# Patient Record
Sex: FEMALE | Race: OTHER | NOT HISPANIC OR LATINO
[De-identification: names, ages, dates, MRNs, and addresses within clinical notes are randomized per-mention and may not be internally consistent; named-entity substitution may affect disease eponyms.]

---

## 2019-06-13 ENCOUNTER — FORM ENCOUNTER (OUTPATIENT)
Age: 59
End: 2019-06-13

## 2020-03-02 ENCOUNTER — FORM ENCOUNTER (OUTPATIENT)
Age: 60
End: 2020-03-02

## 2020-03-05 ENCOUNTER — FORM ENCOUNTER (OUTPATIENT)
Age: 60
End: 2020-03-05

## 2020-03-12 ENCOUNTER — FORM ENCOUNTER (OUTPATIENT)
Age: 60
End: 2020-03-12

## 2020-03-18 ENCOUNTER — FORM ENCOUNTER (OUTPATIENT)
Age: 60
End: 2020-03-18

## 2020-04-09 ENCOUNTER — FORM ENCOUNTER (OUTPATIENT)
Age: 60
End: 2020-04-09

## 2020-07-23 ENCOUNTER — FORM ENCOUNTER (OUTPATIENT)
Age: 60
End: 2020-07-23

## 2020-09-01 ENCOUNTER — FORM ENCOUNTER (OUTPATIENT)
Age: 60
End: 2020-09-01

## 2020-09-03 ENCOUNTER — FORM ENCOUNTER (OUTPATIENT)
Age: 60
End: 2020-09-03

## 2020-09-18 ENCOUNTER — FORM ENCOUNTER (OUTPATIENT)
Age: 60
End: 2020-09-18

## 2020-10-11 ENCOUNTER — FORM ENCOUNTER (OUTPATIENT)
Age: 60
End: 2020-10-11

## 2020-10-18 ENCOUNTER — FORM ENCOUNTER (OUTPATIENT)
Age: 60
End: 2020-10-18

## 2020-11-01 ENCOUNTER — FORM ENCOUNTER (OUTPATIENT)
Age: 60
End: 2020-11-01

## 2020-11-08 ENCOUNTER — FORM ENCOUNTER (OUTPATIENT)
Age: 60
End: 2020-11-08

## 2021-02-11 PROBLEM — Z00.00 ENCOUNTER FOR PREVENTIVE HEALTH EXAMINATION: Status: ACTIVE | Noted: 2021-02-11

## 2021-02-12 DIAGNOSIS — Z17.1 MALIGNANT NEOPLASM OF UPPER-OUTER QUADRANT OF RIGHT FEMALE BREAST: ICD-10-CM

## 2021-02-12 DIAGNOSIS — H04.129 DRY EYE SYNDROME OF UNSPECIFIED LACRIMAL GLAND: ICD-10-CM

## 2021-02-12 DIAGNOSIS — Z80.3 FAMILY HISTORY OF MALIGNANT NEOPLASM OF BREAST: ICD-10-CM

## 2021-02-12 DIAGNOSIS — R92.2 INCONCLUSIVE MAMMOGRAM: ICD-10-CM

## 2021-02-12 DIAGNOSIS — C50.411 MALIGNANT NEOPLASM OF UPPER-OUTER QUADRANT OF RIGHT FEMALE BREAST: ICD-10-CM

## 2021-02-12 DIAGNOSIS — Z85.3 PERSONAL HISTORY OF MALIGNANT NEOPLASM OF BREAST: ICD-10-CM

## 2021-02-12 DIAGNOSIS — Z86.39 PERSONAL HISTORY OF OTHER ENDOCRINE, NUTRITIONAL AND METABOLIC DISEASE: ICD-10-CM

## 2021-02-16 ENCOUNTER — APPOINTMENT (OUTPATIENT)
Dept: BREAST CENTER | Facility: CLINIC | Age: 61
End: 2021-02-16
Payer: COMMERCIAL

## 2021-02-16 VITALS
HEIGHT: 66 IN | BODY MASS INDEX: 22.66 KG/M2 | SYSTOLIC BLOOD PRESSURE: 127 MMHG | DIASTOLIC BLOOD PRESSURE: 81 MMHG | WEIGHT: 141 LBS

## 2021-02-16 DIAGNOSIS — J30.2 OTHER SEASONAL ALLERGIC RHINITIS: ICD-10-CM

## 2021-02-16 PROCEDURE — 99072 ADDL SUPL MATRL&STAF TM PHE: CPT

## 2021-02-16 PROCEDURE — 99214 OFFICE O/P EST MOD 30 MIN: CPT

## 2021-02-16 RX ORDER — ADO-TRASTUZUMAB EMTANSINE 20 MG/ML
INJECTION, POWDER, LYOPHILIZED, FOR SOLUTION INTRAVENOUS
Refills: 0 | Status: ACTIVE | COMMUNITY

## 2021-02-16 RX ORDER — ESZOPICLONE 3 MG/1
TABLET, COATED ORAL
Refills: 0 | Status: ACTIVE | COMMUNITY

## 2021-02-16 NOTE — PHYSICAL EXAM
[Normocephalic] : normocephalic [Atraumatic] : atraumatic [EOMI] : extra ocular movement intact [Supple] : supple [No Supraclavicular Adenopathy] : no supraclavicular adenopathy [No Cervical Adenopathy] : no cervical adenopathy [Normal Sinus Rhythm] : normal sinus rhythm [Examined in the supine and seated position] : examined in the supine and seated position [No dominant masses] : no dominant masses in right breast  [No dominant masses] : no dominant masses left breast [No Nipple Retraction] : no left nipple retraction [No Nipple Discharge] : no left nipple discharge [Breast Mass Right Breast ___cm] : no masses [Breast Mass Left Breast ___cm] : no masses [Breast Nipple Inversion] : nipples not inverted [Breast Nipple Retraction] : nipples not retracted [Breast Nipple Flattening] : nipples not flattened [Breast Nipple Fissures] : nipples not fissured [No Axillary Lymphadenopathy] : no left axillary lymphadenopathy [No Edema] : no edema [No Rashes] : no rashes [No Ulceration] : no ulceration [de-identified] : On exam, the patient has the obvious bilateral reduction mastopexy's.  She did have radiation to the right breast and does have some increased skin thickening.  I cannot feel any evidence of recurrence in the right breast and her left breast is negative.  She has no axillary, supraclavicular, or cervical adenopathy. [de-identified] : Status post partial mastectomy with reduction mastopexy and external beam radiation [de-identified] : Status post reduction mastopexy for symmetry

## 2021-02-16 NOTE — REASON FOR VISIT
[Follow-Up: _____] : a [unfilled] follow-up visit [FreeTextEntry1] : The patient comes in with a history of a right breast upper outer quadrant high-grade invasive ductal cancer which was ER NM negative HER-2/hugh 3+ with a high Ki-67 diagnosed around March 2020 for which she underwent neoadjuvant TCHP chemotherapy with  and then underwent a right breast partial mastectomy and sentinel lymph node biopsy with bilateral reduction mastopexy by Dr. Griffiths on October 12, 2020.  She had 3 negative right axillary lymph nodes and a residual 8 mm focus of high-grade invasive duct cancer which was downgraded from a clinical stage IIA to a pathologic prognostic stage IA breast cancer after neoadjuvant chemotherapy.  She was switched to Kadcyla and underwent external beam radiation to the right breast and comes in for routine follow-up.

## 2021-02-16 NOTE — REVIEW OF SYSTEMS
[As Noted in HPI] : as noted in HPI [Constipation] : constipation [Negative] : Heme/Lymph [FreeTextEntry2] : fATIGUE

## 2021-02-16 NOTE — HISTORY OF PRESENT ILLNESS
[FreeTextEntry1] : The patient is a 60-year-old  postmenopausal female of -American descent.  She underwent menarche at age 15 and had her first child at age 32.  She underwent menopause at age 49 and never took any hormone replacement therapy.  She has a strong family history with her maternal aunt who had breast cancer at age 65 and she has a maternal cousin who had breast cancer at age 49 and a paternal cousin had breast cancer at age 50.  The patient felt a mass towards the upper outer aspect of the right breast and underwent a mammography and ultrasound on March 3, 2020 showing a 2.9 x 2.2 x 2.2 cm mass in the right breast 10:00 region 7 cm from the nipple.  There was also a suspicious right axillary lymph node seen in the central right breast 9:00 intramammary lymph node.  Ultrasound-guided core biopsy of the right breast 10:00 mass 7 cm from the nipple as well as the intramammary lymph node at the 9:00 region central and centimeters from the nipple was performed on 2020.  The right breast 10:00 density came back as a high-grade infiltrating ductal cancer which was ER/IN negative HER-2/hugh 3+ by IHC with a Ki-67 of 80%.  The intramammary lymph node at the 9 o'clock position came back as a benign reactive lymph node.  The patient underwent a bilateral breast MRI in 2020 showing the known cancer in the right breast 10:00 region 7 cm from the nipple measuring 2.8 x 3.3 x 3.5 cm.  There was another area of concern in the right breast 9:00 region showing a possible ultrasound correlate 1 cm from the nipple and in the 10:00 region 12 cm from the nipple.  Ultrasound-guided core biopsy was performed in the 9:00 region 1 cm from the nipple which is showed fibrocystic change and stromal fibrosis.  She underwent [facility] genetic panel testing in 2020 which was negative.  She underwent TCHP chemotherapy with Dr. Krueger and then underwent a follow-up MRI on 2020 showing a complete radiologic response to the right breast cancer.  She then underwent a right breast partial mastectomy with sentinel lymph node biopsy and bilateral reduction mastopexy's by Dr. Lees on 2020.  Final pathology showed 3 negative right axillary sentinel lymph nodes and the partial mastectomy showed an 8 mm residual focus of invasive high-grade ductal breast cancer which remained ER/IN negative HER-2/hugh 3+ positive.  She was downgraded from a clinical stage IIA to a pathologic prognostic stage IA breast cancer after neoadjuvant chemotherapy.  She underwent external beam radiation through Dr. Cervantes which finished in 2020 and was switched to [facility] by Dr. Krueger and comes in now for routine follow-up.

## 2021-02-16 NOTE — ASSESSMENT
[FreeTextEntry1] : The patient is a 60-year-old  postmenopausal female of -American descent.  She underwent menarche at age 15 and had her first child at age 32.  She underwent menopause at age 49 and never took any hormone replacement therapy.  She has a strong family history with her maternal aunt who had breast cancer at age 65 and she has a maternal cousin who had breast cancer at age 49 and a paternal cousin had breast cancer at age 50.  The patient felt a mass towards the upper outer aspect of the right breast and underwent a mammography and ultrasound on March 3, 2020 showing a 2.9 x 2.2 x 2.2 cm mass in the right breast 10:00 region 7 cm from the nipple.  There was also a suspicious right axillary lymph node seen in the central right breast 9:00 intramammary lymph node.  Ultrasound-guided core biopsy of the right breast 10:00 mass 7 cm from the nipple as well as the intramammary lymph node at the 9:00 region central and centimeters from the nipple was performed on 2020.  The right breast 10:00 density came back as a high-grade infiltrating ductal cancer which was ER/LA negative HER-2/hugh 3+ by IHC with a Ki-67 of 80%.  The intramammary lymph node at the 9 o'clock position came back as a benign reactive lymph node.  The patient underwent a bilateral breast MRI in 2020 showing the known cancer in the right breast 10:00 region 7 cm from the nipple measuring 2.8 x 3.3 x 3.5 cm.  There was another area of concern in the right breast 9:00 region showing a possible ultrasound correlate 1 cm from the nipple and in the 10:00 region 12 cm from the nipple.  Ultrasound-guided core biopsy was performed in the 9:00 region 1 cm from the nipple which is showed fibrocystic change and stromal fibrosis.  She underwent [facility] genetic panel testing in 2020 which was negative.  She underwent TCHP chemotherapy with Dr. Krueger and then underwent a follow-up MRI on 2020 showing a complete radiologic response to the right breast cancer.  She then underwent a right breast partial mastectomy with sentinel lymph node biopsy and bilateral reduction mastopexy's by Dr. Lees on 2020.  Final pathology showed 3 negative right axillary sentinel lymph nodes and the partial mastectomy showed an 8 mm residual focus of invasive high-grade ductal breast cancer which remained ER/LA negative HER-2/hugh 3+ positive.  She was downgraded from a clinical stage IIA to a pathologic prognostic stage IA breast cancer after neoadjuvant chemotherapy.  She underwent external beam radiation through Dr. Cervantes which finished in 2020 and was switched to Kadcyla by Dr. Krueger.  On exam, she has typical postop and post radiation changes to the right breast but no evidence of recurrence.  The patient is due for her bilateral mammography and ultrasound again in 2021.  She can follow-up again in 6 months and should remain on Kadcyla through Dr. Krueger.

## 2021-08-19 ENCOUNTER — NON-APPOINTMENT (OUTPATIENT)
Age: 61
End: 2021-08-19

## 2021-08-19 ENCOUNTER — APPOINTMENT (OUTPATIENT)
Dept: BREAST CENTER | Facility: CLINIC | Age: 61
End: 2021-08-19
Payer: COMMERCIAL

## 2021-08-19 VITALS
BODY MASS INDEX: 22.66 KG/M2 | SYSTOLIC BLOOD PRESSURE: 106 MMHG | HEART RATE: 76 BPM | HEIGHT: 66 IN | WEIGHT: 141 LBS | DIASTOLIC BLOOD PRESSURE: 72 MMHG | OXYGEN SATURATION: 99 %

## 2021-08-19 PROCEDURE — 99214 OFFICE O/P EST MOD 30 MIN: CPT

## 2021-08-19 NOTE — HISTORY OF PRESENT ILLNESS
[FreeTextEntry1] : The patient is a 61-year-old  postmenopausal female of -American descent.  She underwent menarche at age 15 and had her first child at age 32.  She underwent menopause at age 49 and never took any hormone replacement therapy.  She has a strong family history with her maternal aunt who had breast cancer at age 65 and she has a maternal cousin who had breast cancer at age 49 and a paternal cousin had breast cancer at age 50.  The patient felt a mass towards the upper outer aspect of the right breast and underwent a mammography and ultrasound on March 3, 2020 showing a 2.9 x 2.2 x 2.2 cm mass in the right breast 10:00 region 7 cm from the nipple.  There was also a suspicious right axillary lymph node seen in the central right breast 9:00 intramammary lymph node.  Ultrasound-guided core biopsy of the right breast 10:00 mass 7 cm from the nipple as well as the intramammary lymph node at the 9:00 region central and centimeters from the nipple was performed on 2020.  The right breast 10:00 density came back as a high-grade infiltrating ductal cancer which was ER/MT negative HER-2/hugh 3+ by IHC with a Ki-67 of 80%.  The intramammary lymph node at the 9 o'clock position came back as a benign reactive lymph node.  The patient underwent a bilateral breast MRI in 2020 showing the known cancer in the right breast 10:00 region 7 cm from the nipple measuring 2.8 x 3.3 x 3.5 cm.  There was another area of concern in the right breast 9:00 region showing a possible ultrasound correlate 1 cm from the nipple and in the 10:00 region 12 cm from the nipple.  Ultrasound-guided core biopsy was performed in the 9:00 region 1 cm from the nipple which is showed fibrocystic change and stromal fibrosis.  She underwent [facility] genetic panel testing in 2020 which was negative.  She underwent TCHP chemotherapy with Dr. Krueger and then underwent a follow-up MRI on 2020 showing a complete radiologic response to the right breast cancer.  She then underwent a right breast partial mastectomy with sentinel lymph node biopsy and bilateral reduction mastopexy's by Dr. Lees on 2020.  Final pathology showed 3 negative right axillary sentinel lymph nodes and the partial mastectomy showed an 8 mm residual focus of invasive high-grade ductal breast cancer which remained ER/MT negative HER-2/hugh 3+ positive.  She was downgraded from a clinical stage IIA to a pathologic prognostic stage IA breast cancer after neoadjuvant chemotherapy.  She underwent external beam radiation through Dr. Cervantes which finished in 2020 and was switched to Kadcyla by Dr. Krueger which she finished in 2021 and was started on Femara for some reason. She comes in now for routine follow-up.

## 2021-08-19 NOTE — PHYSICAL EXAM
[Normocephalic] : normocephalic [Atraumatic] : atraumatic [EOMI] : extra ocular movement intact [Supple] : supple [No Supraclavicular Adenopathy] : no supraclavicular adenopathy [No Cervical Adenopathy] : no cervical adenopathy [Normal Sinus Rhythm] : normal sinus rhythm [Examined in the supine and seated position] : examined in the supine and seated position [No dominant masses] : no dominant masses in right breast  [No dominant masses] : no dominant masses left breast [No Nipple Retraction] : no left nipple retraction [No Nipple Discharge] : no left nipple discharge [Breast Mass Right Breast ___cm] : no masses [Breast Mass Left Breast ___cm] : no masses [No Axillary Lymphadenopathy] : no left axillary lymphadenopathy [No Edema] : no edema [No Rashes] : no rashes [No Ulceration] : no ulceration [Breast Nipple Inversion] : nipples not inverted [Breast Nipple Retraction] : nipples not retracted [Breast Nipple Fissures] : nipples not fissured [Breast Nipple Flattening] : nipples not flattened [Breast Abnormal Lactation (Galactorrhea)] : no galactorrhea [Breast Abnormal Secretion Bloody Fluid] : no bloody discharge [Breast Abnormal Secretion Serous Fluid] : no serous discharge [Breast Abnormal Secretion Opalescent Fluid] : no milky discharge [de-identified] : On exam, the patient has the obvious bilateral reduction mastopexy's.  She did have radiation to the right breast and does have some increased skin thickening.  I cannot feel any evidence of recurrence in the right breast and her left breast is negative.  She has no axillary, supraclavicular, or cervical adenopathy.  She does have some frozen shoulders bilaterally with some decreased range of motion of her arms. [de-identified] : Status post partial mastectomy with reduction mastopexy and external beam radiation [de-identified] : Status post reduction mastopexy for symmetry

## 2021-08-19 NOTE — REASON FOR VISIT
[Follow-Up: _____] : a [unfilled] follow-up visit [FreeTextEntry1] : The patient comes in with a history of a right breast upper outer quadrant high-grade invasive ductal cancer which was ER MS negative HER-2/hugh 3+ with a high Ki-67 diagnosed around March 2020 for which she underwent neoadjuvant TCHP chemotherapy with  and then underwent a right breast partial mastectomy and sentinel lymph node biopsy with bilateral reduction mastopexy by Dr. Griffiths on October 12, 2020.  She had 3 negative right axillary lymph nodes and a residual 8 mm focus of high-grade invasive duct cancer which was downgraded from a clinical stage IIA to a pathologic prognostic stage IA breast cancer after neoadjuvant chemotherapy.  She was switched to Kadcyla and underwent external beam radiation to the right breast and comes in for routine follow-up.

## 2021-08-19 NOTE — ASSESSMENT
[FreeTextEntry1] : The patient is a 61-year-old  postmenopausal female of -American descent.  She underwent menarche at age 15 and had her first child at age 32.  She underwent menopause at age 49 and never took any hormone replacement therapy.  She has a strong family history with her maternal aunt who had breast cancer at age 65 and she has a maternal cousin who had breast cancer at age 49 and a paternal cousin had breast cancer at age 50.  The patient felt a mass towards the upper outer aspect of the right breast and underwent a mammography and ultrasound on March 3, 2020 showing a 2.9 x 2.2 x 2.2 cm mass in the right breast 10:00 region 7 cm from the nipple.  There was also a suspicious right axillary lymph node seen in the central right breast 9:00 intramammary lymph node.  Ultrasound-guided core biopsy of the right breast 10:00 mass 7 cm from the nipple as well as the intramammary lymph node at the 9:00 region central and centimeters from the nipple was performed on 2020.  The right breast 10:00 density came back as a high-grade infiltrating ductal cancer which was ER/MI negative HER-2/hugh 3+ by IHC with a Ki-67 of 80%.  The intramammary lymph node at the 9 o'clock position came back as a benign reactive lymph node.  The patient underwent a bilateral breast MRI in 2020 showing the known cancer in the right breast 10:00 region 7 cm from the nipple measuring 2.8 x 3.3 x 3.5 cm.  There was another area of concern in the right breast 9:00 region showing a possible ultrasound correlate 1 cm from the nipple and in the 10:00 region 12 cm from the nipple.  Ultrasound-guided core biopsy was performed in the 9:00 region 1 cm from the nipple which is showed fibrocystic change and stromal fibrosis.  She underwent [facility] genetic panel testing in 2020 which was negative.  She underwent TCHP chemotherapy with Dr. Krueger and then underwent a follow-up MRI on 2020 showing a complete radiologic response to the right breast cancer.  She then underwent a right breast partial mastectomy with sentinel lymph node biopsy and bilateral reduction mastopexy's by Dr. Lees on 2020.  Final pathology showed 3 negative right axillary sentinel lymph nodes and the partial mastectomy showed an 8 mm residual focus of invasive high-grade ductal breast cancer which remained ER/MI negative HER-2/hugh 3+ positive.  She was downgraded from a clinical stage IIA to a pathologic prognostic stage IA breast cancer after neoadjuvant chemotherapy.  She underwent external beam radiation through Dr. Cervantes which finished in 2020 and was switched to Kadcyla by Dr. Krueger finished in 2021 and then was placed on Femara for some reason even though the tumor was ER/MI negative.  On exam, she has typical postop and post radiation changes to the right breast but no evidence of recurrence.  The patient underwent her last bilateral mammography and ultrasound at [facility] on May 6, 2021 just showing postop changes in the upper outer aspect of the right breast.  She can follow-up again in 6 months and should remain on Femara through Dr. Krueger.

## 2021-09-02 ENCOUNTER — TRANSCRIPTION ENCOUNTER (OUTPATIENT)
Age: 61
End: 2021-09-02

## 2022-02-08 ENCOUNTER — APPOINTMENT (OUTPATIENT)
Dept: BREAST CENTER | Facility: CLINIC | Age: 62
End: 2022-02-08
Payer: COMMERCIAL

## 2022-02-08 VITALS — BODY MASS INDEX: 24.07 KG/M2 | WEIGHT: 141 LBS | HEIGHT: 64 IN

## 2022-02-08 PROCEDURE — 99213 OFFICE O/P EST LOW 20 MIN: CPT

## 2022-02-08 NOTE — ASSESSMENT
[FreeTextEntry1] : The patient is a 61-year-old  postmenopausal female of -American descent.  She underwent menarche at age 15 and had her first child at age 32.  She underwent menopause at age 49 and never took any hormone replacement therapy.  She has a strong family history with her maternal aunt who had breast cancer at age 65 and she has a maternal cousin who had breast cancer at age 49 and a paternal cousin had breast cancer at age 50.  The patient felt a mass towards the upper outer aspect of the right breast and underwent a mammography and ultrasound on March 3, 2020 showing a 2.9 x 2.2 x 2.2 cm mass in the right breast 10:00 region 7 cm from the nipple.  There was also a suspicious right axillary lymph node seen in the central right breast 9:00 intramammary lymph node.  Ultrasound-guided core biopsy of the right breast 10:00 mass 7 cm from the nipple as well as the intramammary lymph node at the 9:00 region central and centimeters from the nipple was performed on 2020.  The right breast 10:00 density came back as a high-grade infiltrating ductal cancer which was ER/ND negative HER-2/hugh 3+ by IHC with a Ki-67 of 80%.  The intramammary lymph node at the 9 o'clock position came back as a benign reactive lymph node.  The patient underwent a bilateral breast MRI in 2020 showing the known cancer in the right breast 10:00 region 7 cm from the nipple measuring 2.8 x 3.3 x 3.5 cm.  There was another area of concern in the right breast 9:00 region showing a possible ultrasound correlate 1 cm from the nipple and in the 10:00 region 12 cm from the nipple.  Ultrasound-guided core biopsy was performed in the 9:00 region 1 cm from the nipple which is showed fibrocystic change and stromal fibrosis.  She underwent [facility] genetic panel testing in 2020 which was negative.  She underwent TCHP chemotherapy with Dr. Krueger and then underwent a follow-up MRI on 2020 showing a complete radiologic response to the right breast cancer.  She then underwent a right breast partial mastectomy with sentinel lymph node biopsy and bilateral reduction mastopexy's by Dr. Lees on 2020.  Final pathology showed 3 negative right axillary sentinel lymph nodes and the partial mastectomy showed an 8 mm residual focus of invasive high-grade ductal breast cancer which remained ER/ND negative HER-2/hugh 3+ positive.  She was downgraded from a clinical stage IIA to a pathologic prognostic stage IA breast cancer after neoadjuvant chemotherapy.  She underwent external beam radiation through Dr. Cervantes which finished in 2020 and was switched to Kadcyla by Dr. Krueger which finished in 2021 and then was placed on Femara for some reason even though the tumor was ER/ND negative.  On exam, she has typical postop and post radiation changes to the right breast but no evidence of recurrence.  The patient underwent her last bilateral mammography and ultrasound which was reviewed from [facility] performed on May 6, 2021 just showing postop changes in the upper outer aspect of the right breast.  She should follow-up again in 6 months and should remain on Femara through Dr. Krueger.  She will be due for her next bilateral mammography and ultrasound in May 2022 and was given prescriptions.

## 2022-02-08 NOTE — PHYSICAL EXAM
[Normocephalic] : normocephalic [Atraumatic] : atraumatic [EOMI] : extra ocular movement intact [Supple] : supple [No Supraclavicular Adenopathy] : no supraclavicular adenopathy [No Cervical Adenopathy] : no cervical adenopathy [Normal Sinus Rhythm] : normal sinus rhythm [Examined in the supine and seated position] : examined in the supine and seated position [No dominant masses] : no dominant masses in right breast  [No dominant masses] : no dominant masses left breast [No Nipple Retraction] : no left nipple retraction [No Nipple Discharge] : no left nipple discharge [Breast Mass Right Breast ___cm] : no masses [Breast Mass Left Breast ___cm] : no masses [No Axillary Lymphadenopathy] : no left axillary lymphadenopathy [No Edema] : no edema [No Rashes] : no rashes [No Ulceration] : no ulceration [Breast Nipple Inversion] : nipples not inverted [Breast Nipple Retraction] : nipples not retracted [Breast Nipple Flattening] : nipples not flattened [Breast Nipple Fissures] : nipples not fissured [Breast Abnormal Lactation (Galactorrhea)] : no galactorrhea [Breast Abnormal Secretion Bloody Fluid] : no bloody discharge [Breast Abnormal Secretion Serous Fluid] : no serous discharge [Breast Abnormal Secretion Opalescent Fluid] : no milky discharge [de-identified] : On exam, the patient has the obvious bilateral reduction mastopexy's.  She did have radiation to the right breast and does have some increased skin thickening.  I cannot feel any evidence of recurrence in the right breast and her left breast is negative.  She has no axillary, supraclavicular, or cervical adenopathy. [de-identified] : Status post partial mastectomy with reduction mastopexy and external beam radiation with some skin thickening and mild lymphedema [de-identified] : Status post reduction mastopexy for symmetry

## 2022-02-08 NOTE — REASON FOR VISIT
[Follow-Up: _____] : a [unfilled] follow-up visit [FreeTextEntry1] : The patient comes in with a history of a right breast upper outer quadrant high-grade invasive ductal cancer which was ER FL negative HER-2/hugh 3+ with a high Ki-67 diagnosed around March 2020 for which she underwent neoadjuvant TCHP chemotherapy with  and then underwent a right breast partial mastectomy and sentinel lymph node biopsy with bilateral reduction mastopexy by Dr. Griffiths on October 12, 2020.  She had 3 negative right axillary lymph nodes and a residual 8 mm focus of high-grade invasive duct cancer which was downgraded from a clinical stage IIA to a pathologic prognostic stage IA breast cancer after neoadjuvant chemotherapy.  She was switched to Kadcyla and underwent external beam radiation to the right breast and comes in for routine follow-up.

## 2022-02-08 NOTE — HISTORY OF PRESENT ILLNESS
[FreeTextEntry1] : The patient is a 61-year-old  postmenopausal female of -American descent.  She underwent menarche at age 15 and had her first child at age 32.  She underwent menopause at age 49 and never took any hormone replacement therapy.  She has a strong family history with her maternal aunt who had breast cancer at age 65 and she has a maternal cousin who had breast cancer at age 49 and a paternal cousin had breast cancer at age 50.  The patient felt a mass towards the upper outer aspect of the right breast and underwent a mammography and ultrasound on March 3, 2020 showing a 2.9 x 2.2 x 2.2 cm mass in the right breast 10:00 region 7 cm from the nipple.  There was also a suspicious right axillary lymph node seen in the central right breast 9:00 intramammary lymph node.  Ultrasound-guided core biopsy of the right breast 10:00 mass 7 cm from the nipple as well as the intramammary lymph node at the 9:00 region central and centimeters from the nipple was performed on 2020.  The right breast 10:00 density came back as a high-grade infiltrating ductal cancer which was ER/NC negative HER-2/hugh 3+ by IHC with a Ki-67 of 80%.  The intramammary lymph node at the 9 o'clock position came back as a benign reactive lymph node.  The patient underwent a bilateral breast MRI in 2020 showing the known cancer in the right breast 10:00 region 7 cm from the nipple measuring 2.8 x 3.3 x 3.5 cm.  There was another area of concern in the right breast 9:00 region showing a possible ultrasound correlate 1 cm from the nipple and in the 10:00 region 12 cm from the nipple.  Ultrasound-guided core biopsy was performed in the 9:00 region 1 cm from the nipple which is showed fibrocystic change and stromal fibrosis.  She underwent [facility] genetic panel testing in 2020 which was negative.  She underwent TCHP chemotherapy with Dr. Krueger and then underwent a follow-up MRI on 2020 showing a complete radiologic response to the right breast cancer.  She then underwent a right breast partial mastectomy with sentinel lymph node biopsy and bilateral reduction mastopexy's by Dr. Lees on 2020.  Final pathology showed 3 negative right axillary sentinel lymph nodes and the partial mastectomy showed an 8 mm residual focus of invasive high-grade ductal breast cancer which remained ER/NC negative HER-2/hugh 3+ positive.  She was downgraded from a clinical stage IIA to a pathologic prognostic stage IA breast cancer after neoadjuvant chemotherapy.  She underwent external beam radiation through Dr. Cervantes which finished in 2020 and was switched to Kadcyla by Dr. Krueger which she finished in 2021 and was started on Femara for some reason. She comes in now for routine follow-up.

## 2022-08-09 ENCOUNTER — APPOINTMENT (OUTPATIENT)
Dept: BREAST CENTER | Facility: CLINIC | Age: 62
End: 2022-08-09

## 2022-08-09 VITALS
SYSTOLIC BLOOD PRESSURE: 129 MMHG | HEART RATE: 74 BPM | BODY MASS INDEX: 26.61 KG/M2 | DIASTOLIC BLOOD PRESSURE: 83 MMHG | WEIGHT: 155 LBS | OXYGEN SATURATION: 98 %

## 2022-08-09 PROCEDURE — 99213 OFFICE O/P EST LOW 20 MIN: CPT

## 2022-08-09 NOTE — REASON FOR VISIT
[Follow-Up: _____] : a [unfilled] follow-up visit [FreeTextEntry1] : The patient comes in with a history of a right breast upper outer quadrant high-grade invasive ductal cancer which was ER IA negative HER-2/hugh 3+ with a high Ki-67 diagnosed around March 2020 for which she underwent neoadjuvant TCHP chemotherapy with  and then underwent a right breast partial mastectomy and sentinel lymph node biopsy with bilateral reduction mastopexy by Dr. Griffiths on October 12, 2020.  She had 3 negative right axillary lymph nodes and a residual 8 mm focus of high-grade invasive duct cancer which was downgraded from a clinical stage IIA to a pathologic prognostic stage IA breast cancer after neoadjuvant chemotherapy.  She was switched to Kadcyla and underwent external beam radiation to the right breast and comes in for routine follow-up.

## 2022-08-09 NOTE — HISTORY OF PRESENT ILLNESS
[FreeTextEntry1] : The patient is a 62-year-old  postmenopausal female of -American descent.  She underwent menarche at age 15 and had her first child at age 32.  She underwent menopause at age 49 and never took any hormone replacement therapy.  She has a strong family history with her maternal aunt who had breast cancer at age 65 and she has a maternal cousin who had breast cancer at age 49 and a paternal cousin had breast cancer at age 50.  The patient felt a mass towards the upper outer aspect of the right breast and underwent a mammography and ultrasound on March 3, 2020 showing a 2.9 x 2.2 x 2.2 cm mass in the right breast 10:00 region 7 cm from the nipple.  There was also a suspicious right axillary lymph node seen and a central right breast 9:00 intramammary lymph node.  Ultrasound-guided core biopsy of the right breast 10:00 mass 7 cm from the nipple as well as the intramammary lymph node at the 9:00 region 7 centimeters from the nipple was performed on 2020.  The right breast 10:00 density came back as a high-grade infiltrating ductal cancer which was ER/OH negative HER-2/hugh 3+ by IHC with a Ki-67 of 80%.  The intramammary lymph node at the 9 o'clock position came back as a benign reactive lymph node.  The patient underwent a bilateral breast MRI in 2020 showing the known cancer in the right breast 10:00 region 7 cm from the nipple measuring 2.8 x 3.3 x 3.5 cm.  There was another area of concern in the right breast 9:00 region showing a possible ultrasound correlate 1 cm from the nipple and in the 10:00 region 12 cm from the nipple.  Ultrasound-guided core biopsy was performed in the 9:00 region 1 cm from the nipple which is showed fibrocystic change and stromal fibrosis.  She underwent [facility] genetic panel testing in 2020 which was negative.  She underwent TCHP chemotherapy with Dr. Krueger and then underwent a follow-up MRI on 2020 showing a complete radiologic response to the right breast cancer.  She then underwent a right breast partial mastectomy with sentinel lymph node biopsy and bilateral reduction mastopexy's by Dr. Lees on 2020.  Final pathology showed 3 negative right axillary sentinel lymph nodes and the partial mastectomy showed an 8 mm residual focus of invasive high-grade ductal breast cancer which remained ER/OH negative HER-2/hugh 3+ positive.  She was downgraded from a clinical stage IIA to a pathologic prognostic stage IA breast cancer after neoadjuvant chemotherapy.  She underwent external beam radiation through Dr. Cervantes which finished in 2020 and was switched to Kadcyla by Dr. Krueger which she finished in 2021 and was started on Femara for some reason. She comes in now for routine follow-up.

## 2022-08-09 NOTE — ASSESSMENT
[FreeTextEntry1] : The patient is a 62-year-old  postmenopausal female of -American descent.  She underwent menarche at age 15 and had her first child at age 32.  She underwent menopause at age 49 and never took any hormone replacement therapy.  She has a strong family history with her maternal aunt who had breast cancer at age 65 and she has a maternal cousin who had breast cancer at age 49 and a paternal cousin had breast cancer at age 50.  The patient felt a mass towards the upper outer aspect of the right breast and underwent a mammography and ultrasound on March 3, 2020 showing a 2.9 x 2.2 x 2.2 cm mass in the right breast 10:00 region 7 cm from the nipple.  There was also a suspicious right axillary lymph node seen and a central right breast 9:00 intramammary lymph node.  Ultrasound-guided core biopsy of the right breast 10:00 mass 7 cm from the nipple as well as the intramammary lymph node at the 9:00 region 7 centimeters from the nipple was performed on 2020.  The right breast 10:00 density came back as a high-grade infiltrating ductal cancer which was ER/ID negative HER-2/hugh 3+ by IHC with a Ki-67 of 80%.  The intramammary lymph node at the 9 o'clock position came back as a benign reactive lymph node.  The patient underwent a bilateral breast MRI in 2020 showing the known cancer in the right breast 10:00 region 7 cm from the nipple measuring 2.8 x 3.3 x 3.5 cm.  There was another area of concern in the right breast 9:00 region showing a possible ultrasound correlate 1 cm from the nipple and in the 10:00 region 12 cm from the nipple.  Ultrasound-guided core biopsy was performed in the 9:00 region 1 cm from the nipple which showed fibrocystic change and stromal fibrosis.  She underwent [facility] genetic panel testing in 2020 which was negative.  She underwent TCHP chemotherapy with Dr. Krueger and then underwent a follow-up MRI on 2020 showing a complete radiologic response to the right breast cancer.  She then underwent a right breast partial mastectomy with sentinel lymph node biopsy and bilateral reduction mastopexy's by Dr. Lees on 2020.  Final pathology showed 3 negative right axillary sentinel lymph nodes and the partial mastectomy showed an 8 mm residual focus of invasive high-grade ductal breast cancer which remained ER/ID negative HER-2/hugh 3+ positive.  She was downgraded from a clinical stage IIA to a pathologic prognostic stage IA breast cancer after neoadjuvant chemotherapy.  She underwent external beam radiation through Dr. Cervantes which finished in 2020 and was switched to Kadcyla by Dr. Krueger which finished in 2021 and then was placed on Femara for some reason even though the tumor was ER/ID negative.  On exam, she has typical postop and post radiation changes to the right breast but no evidence of recurrence.  The patient underwent her last bilateral mammography and ultrasound which was reviewed from [facility] performed on May 20, 2022 just showing postop changes in the upper outer aspect of the right breast.  She should follow-up again in 6 months and should remain on Femara through Dr. Krueger.  She will be due for her next bilateral mammography and ultrasound in May 2023 and was given prescriptions.

## 2023-02-08 ENCOUNTER — APPOINTMENT (OUTPATIENT)
Dept: BREAST CENTER | Facility: CLINIC | Age: 63
End: 2023-02-08
Payer: COMMERCIAL

## 2023-02-08 ENCOUNTER — NON-APPOINTMENT (OUTPATIENT)
Age: 63
End: 2023-02-08

## 2023-02-08 VITALS
BODY MASS INDEX: 26.43 KG/M2 | WEIGHT: 154 LBS | HEART RATE: 80 BPM | DIASTOLIC BLOOD PRESSURE: 72 MMHG | OXYGEN SATURATION: 98 % | SYSTOLIC BLOOD PRESSURE: 116 MMHG

## 2023-02-08 PROCEDURE — 99213 OFFICE O/P EST LOW 20 MIN: CPT

## 2023-02-08 NOTE — REASON FOR VISIT
[Follow-Up: _____] : a [unfilled] follow-up visit [FreeTextEntry1] : The patient comes in with a history of a right breast upper outer quadrant high-grade invasive ductal cancer which was ER MT negative HER-2/hugh 3+ with a high Ki-67 diagnosed around March 2020 for which she underwent neoadjuvant TCHP chemotherapy with  and then underwent a right breast partial mastectomy and sentinel lymph node biopsy with bilateral reduction mastopexy by Dr. Griffiths on October 12, 2020.  She had 3 negative right axillary lymph nodes and a residual 8 mm focus of high-grade invasive duct cancer which was downgraded from a clinical stage IIA to a pathologic prognostic stage IA breast cancer after neoadjuvant chemotherapy.  She was switched to Kadcyla and underwent external beam radiation to the right breast and comes in for routine follow-up.

## 2023-02-08 NOTE — ASSESSMENT
[FreeTextEntry1] : The patient is a 62-year-old  postmenopausal female of -American descent.  She underwent menarche at age 15 and had her first child at age 32.  She underwent menopause at age 49 and never took any hormone replacement therapy.  She has a strong family history with her maternal aunt who had breast cancer at age 65 and she has a maternal cousin who had breast cancer at age 49 and a paternal cousin had breast cancer at age 50.  The patient felt a mass towards the upper outer aspect of the right breast and underwent a mammography and ultrasound on March 3, 2020 showing a 2.9 x 2.2 x 2.2 cm mass in the right breast 10:00 region 7 cm from the nipple.  There was also a suspicious right axillary lymph node seen and a central right breast 9:00 intramammary lymph node.  Ultrasound-guided core biopsy of the right breast 10:00 mass 7 cm from the nipple as well as the intramammary lymph node at the 9:00 region 7 centimeters from the nipple was performed on 2020.  The right breast 10:00 density came back as a high-grade infiltrating ductal cancer which was ER/MI negative HER-2/hugh 3+ by IHC with a Ki-67 of 80%.  The intramammary lymph node at the 9 o'clock position came back as a benign reactive lymph node.  The patient underwent a bilateral breast MRI in 2020 showing the known cancer in the right breast 10:00 region 7 cm from the nipple measuring 2.8 x 3.3 x 3.5 cm.  There was another area of concern in the right breast 9:00 region showing a possible ultrasound correlate 1 cm from the nipple and in the 10:00 region 12 cm from the nipple.  Ultrasound-guided core biopsy was performed in the 9:00 region 1 cm from the nipple which showed fibrocystic change and stromal fibrosis.  She underwent [facility] genetic panel testing in 2020 which was negative.  She underwent TCHP chemotherapy with Dr. Krueger and then underwent a follow-up MRI on 2020 showing a complete radiologic response to the right breast cancer.  She then underwent a right breast partial mastectomy with sentinel lymph node biopsy and bilateral reduction mastopexy's by Dr. Lees on 2020.  Final pathology showed 3 negative right axillary sentinel lymph nodes and the partial mastectomy showed an 8 mm residual focus of invasive high-grade ductal breast cancer which remained ER/MI negative HER-2/hugh 3+ positive.  She was downgraded from a clinical stage IIA to a pathologic prognostic stage IA breast cancer after neoadjuvant chemotherapy.  She underwent external beam radiation through Dr. Cervantes which finished in 2020 and was switched to Kadcyla by Dr. Krueger which finished in 2021 and then was placed on Femara for some reason even though the tumor was ER/MI negative.  On exam, she has typical postop and post radiation changes to the right breast but no evidence of recurrence.  The patient underwent her last bilateral mammography and ultrasound which was reviewed from [facility] performed on May 20, 2022 just showing postop changes in the upper outer aspect of the right breast.  She should follow-up again in 6 months and should remain on Femara through Dr. Diaz.  She will be due for her next bilateral mammography and ultrasound in May 2023 and she was given prescriptions.

## 2023-02-08 NOTE — HISTORY OF PRESENT ILLNESS
[FreeTextEntry1] : The patient is a 62-year-old  postmenopausal female of -American descent.  She underwent menarche at age 15 and had her first child at age 32.  She underwent menopause at age 49 and never took any hormone replacement therapy.  She has a strong family history with her maternal aunt who had breast cancer at age 65 and she has a maternal cousin who had breast cancer at age 49 and a paternal cousin had breast cancer at age 50.  The patient felt a mass towards the upper outer aspect of the right breast and underwent a mammography and ultrasound on March 3, 2020 showing a 2.9 x 2.2 x 2.2 cm mass in the right breast 10:00 region 7 cm from the nipple.  There was also a suspicious right axillary lymph node seen and a central right breast 9:00 intramammary lymph node.  Ultrasound-guided core biopsy of the right breast 10:00 mass 7 cm from the nipple as well as the intramammary lymph node at the 9:00 region 7 centimeters from the nipple was performed on 2020.  The right breast 10:00 density came back as a high-grade infiltrating ductal cancer which was ER/ID negative HER-2/hugh 3+ by IHC with a Ki-67 of 80%.  The intramammary lymph node at the 9 o'clock position came back as a benign reactive lymph node.  The patient underwent a bilateral breast MRI in 2020 showing the known cancer in the right breast 10:00 region 7 cm from the nipple measuring 2.8 x 3.3 x 3.5 cm.  There was another area of concern in the right breast 9:00 region showing a possible ultrasound correlate 1 cm from the nipple and in the 10:00 region 12 cm from the nipple.  Ultrasound-guided core biopsy was performed in the 9:00 region 1 cm from the nipple which is showed fibrocystic change and stromal fibrosis.  She underwent [facility] genetic panel testing in 2020 which was negative.  She underwent TCHP chemotherapy with Dr. Krueger and then underwent a follow-up MRI on 2020 showing a complete radiologic response to the right breast cancer.  She then underwent a right breast partial mastectomy with sentinel lymph node biopsy and bilateral reduction mastopexy's by Dr. Lees on 2020.  Final pathology showed 3 negative right axillary sentinel lymph nodes and the partial mastectomy showed an 8 mm residual focus of invasive high-grade ductal breast cancer which remained ER/ID negative HER-2/hugh 3+ positive.  She was downgraded from a clinical stage IIA to a pathologic prognostic stage IA breast cancer after neoadjuvant chemotherapy.  She underwent external beam radiation through Dr. Cervantes which finished in 2020 and was switched to Kadcyla by Dr. Krueger which she finished in 2021 and was started on Femara for some reason. She comes in now for routine follow-up and continues to get yearly mammography and ultrasounds.

## 2023-02-08 NOTE — PHYSICAL EXAM
[Normocephalic] : normocephalic [Atraumatic] : atraumatic [EOMI] : extra ocular movement intact [Supple] : supple [No Supraclavicular Adenopathy] : no supraclavicular adenopathy [No Cervical Adenopathy] : no cervical adenopathy [Normal Sinus Rhythm] : normal sinus rhythm [Examined in the supine and seated position] : examined in the supine and seated position [No dominant masses] : no dominant masses in right breast  [No dominant masses] : no dominant masses left breast [No Nipple Retraction] : no left nipple retraction [No Nipple Discharge] : no left nipple discharge [Breast Mass Right Breast ___cm] : no masses [Breast Mass Left Breast ___cm] : no masses [No Axillary Lymphadenopathy] : no left axillary lymphadenopathy [No Edema] : no edema [No Rashes] : no rashes [No Ulceration] : no ulceration [Breast Nipple Inversion] : nipples not inverted [Breast Nipple Retraction] : nipples not retracted [Breast Nipple Flattening] : nipples not flattened [Breast Nipple Fissures] : nipples not fissured [Breast Abnormal Lactation (Galactorrhea)] : no galactorrhea [Breast Abnormal Secretion Bloody Fluid] : no bloody discharge [Breast Abnormal Secretion Serous Fluid] : no serous discharge [Breast Abnormal Secretion Opalescent Fluid] : no milky discharge [de-identified] : On exam, the patient has the obvious bilateral reduction mastopexy's.  She did have radiation to the right breast and does have some increased skin thickening.  I cannot feel any evidence of recurrence in the right breast and her left breast is negative.  She has no axillary, supraclavicular, or cervical adenopathy. [de-identified] : Status post partial mastectomy with reduction mastopexy and external beam radiation with some skin thickening and mild lymphedema [de-identified] : Status post reduction mastopexy for symmetry

## 2023-05-24 ENCOUNTER — RESULT REVIEW (OUTPATIENT)
Age: 63
End: 2023-05-24

## 2023-05-25 ENCOUNTER — NON-APPOINTMENT (OUTPATIENT)
Age: 63
End: 2023-05-25

## 2023-05-26 DIAGNOSIS — R92.0 MAMMOGRAPHIC MICROCALCIFICATION FOUND ON DIAGNOSTIC IMAGING OF BREAST: ICD-10-CM

## 2023-06-01 ENCOUNTER — RESULT REVIEW (OUTPATIENT)
Age: 63
End: 2023-06-01

## 2023-08-09 ENCOUNTER — APPOINTMENT (OUTPATIENT)
Dept: BREAST CENTER | Facility: CLINIC | Age: 63
End: 2023-08-09
Payer: COMMERCIAL

## 2023-08-09 VITALS
HEIGHT: 64 IN | SYSTOLIC BLOOD PRESSURE: 124 MMHG | OXYGEN SATURATION: 97 % | WEIGHT: 165 LBS | BODY MASS INDEX: 28.17 KG/M2 | HEART RATE: 70 BPM | DIASTOLIC BLOOD PRESSURE: 85 MMHG

## 2023-08-09 PROCEDURE — 99213 OFFICE O/P EST LOW 20 MIN: CPT

## 2023-08-09 NOTE — HISTORY OF PRESENT ILLNESS
[FreeTextEntry1] : The patient is a 63-year-old  postmenopausal female of -American descent.  She underwent menarche at age 15 and had her first child at age 32.  She underwent menopause at age 49 and never took any hormone replacement therapy.  She has a strong family history with her maternal aunt who had breast cancer at age 65 and she has a maternal cousin who had breast cancer at age 49 and a paternal cousin had breast cancer at age 50.  The patient felt a mass towards the upper outer aspect of the right breast and underwent a mammography and ultrasound on March 3, 2020 showing a 2.9 x 2.2 x 2.2 cm mass in the right breast 10:00 region 7 cm from the nipple.  There was also a suspicious right axillary lymph node seen and a central right breast 9:00 intramammary lymph node.  Ultrasound-guided core biopsy of the right breast 10:00 mass 7 cm from the nipple as well as the intramammary lymph node at the 9:00 region 7 centimeters from the nipple was performed on 2020.  The right breast 10:00 density came back as a high-grade infiltrating ductal cancer which was ER/LA negative HER-2/huhg 3+ by IHC with a Ki-67 of 80%.  The intramammary lymph node at the 9 o'clock position came back as a benign reactive lymph node.  The patient underwent a bilateral breast MRI in 2020 showing the known cancer in the right breast 10:00 region 7 cm from the nipple measuring 2.8 x 3.3 x 3.5 cm.  There was another area of concern in the right breast 9:00 region showing a possible ultrasound correlate 1 cm from the nipple and in the 10:00 region 12 cm from the nipple.  Ultrasound-guided core biopsy was performed in the 9:00 region 1 cm from the nipple which is showed fibrocystic change and stromal fibrosis.  She underwent [facility] genetic panel testing in 2020 which was negative.  She underwent TCHP chemotherapy with Dr. Krueger and then underwent a follow-up MRI on 2020 showing a complete radiologic response to the right breast cancer.  She then underwent a right breast partial mastectomy with sentinel lymph node biopsy and bilateral reduction mastopexy's by Dr. Lees on 2020.  Final pathology showed 3 negative right axillary sentinel lymph nodes and the partial mastectomy showed an 8 mm residual focus of invasive high-grade ductal breast cancer which remained ER/LA negative HER-2/hugh 3+ positive.  She was downgraded from a clinical stage IIA to a pathologic prognostic stage IA breast cancer after neoadjuvant chemotherapy.  She underwent external beam radiation through Dr. Cervantes which finished in 2020 and was switched to Kadcyla by Dr. Krueger which she finished in 2021 and was started on Femara for some reason. She comes in now for routine follow-up and continues to get yearly mammography and ultrasounds.

## 2023-08-09 NOTE — PHYSICAL EXAM
[Normocephalic] : normocephalic [Atraumatic] : atraumatic [Supple] : supple [EOMI] : extra ocular movement intact [No Supraclavicular Adenopathy] : no supraclavicular adenopathy [No Cervical Adenopathy] : no cervical adenopathy [Normal Sinus Rhythm] : normal sinus rhythm [Examined in the supine and seated position] : examined in the supine and seated position [No dominant masses] : no dominant masses in right breast  [No dominant masses] : no dominant masses left breast [No Nipple Retraction] : no left nipple retraction [No Nipple Discharge] : no left nipple discharge [Breast Mass Left Breast ___cm] : no masses [Breast Mass Right Breast ___cm] : no masses [No Axillary Lymphadenopathy] : no left axillary lymphadenopathy [No Edema] : no edema [No Rashes] : no rashes [No Ulceration] : no ulceration [Breast Nipple Inversion] : nipples not inverted [Breast Nipple Retraction] : nipples not retracted [Breast Nipple Flattening] : nipples not flattened [Breast Nipple Fissures] : nipples not fissured [Breast Abnormal Lactation (Galactorrhea)] : no galactorrhea [Breast Abnormal Secretion Bloody Fluid] : no bloody discharge [Breast Abnormal Secretion Serous Fluid] : no serous discharge [Breast Abnormal Secretion Opalescent Fluid] : no milky discharge [de-identified] : On exam, the patient has the obvious bilateral reduction mastopexy's.  She did have radiation to the right breast and does have some increased skin thickening.  I cannot feel any evidence of recurrence in the right breast and her left breast is negative.  She has no axillary, supraclavicular, or cervical adenopathy. [de-identified] : Status post partial mastectomy with reduction mastopexy and external beam radiation with some skin thickening and mild lymphedema [de-identified] : Status post reduction mastopexy for symmetry

## 2023-08-09 NOTE — REASON FOR VISIT
[Follow-Up: _____] : a [unfilled] follow-up visit [FreeTextEntry1] : The patient comes in with a history of a right breast upper outer quadrant high-grade invasive ductal cancer which was ER VT negative HER-2/hugh 3+ with a high Ki-67 diagnosed around March 2020 for which she underwent neoadjuvant TCHP chemotherapy with  and then underwent a right breast partial mastectomy and sentinel lymph node biopsy with bilateral reduction mastopexy by Dr. Griffiths on October 12, 2020.  She had 3 negative right axillary lymph nodes and a residual 8 mm focus of high-grade invasive duct cancer which was downgraded from a clinical stage IIA to a pathologic prognostic stage IA breast cancer after neoadjuvant chemotherapy.  She was switched to Kadcyla and underwent external beam radiation to the right breast and comes in for routine follow-up.

## 2023-08-09 NOTE — ASSESSMENT
[FreeTextEntry1] : The patient is a 63-year-old  postmenopausal female of -American descent.  She underwent menarche at age 15 and had her first child at age 32.  She underwent menopause at age 49 and never took any hormone replacement therapy.  She has a strong family history with her maternal aunt who had breast cancer at age 65 and she has a maternal cousin who had breast cancer at age 49 and a paternal cousin had breast cancer at age 50.  The patient felt a mass towards the upper outer aspect of the right breast and underwent a mammography and ultrasound on March 3, 2020 showing a 2.9 x 2.2 x 2.2 cm mass in the right breast 10:00 region 7 cm from the nipple.  There was also a suspicious right axillary lymph node seen and a central right breast 9:00 intramammary lymph node.  Ultrasound-guided core biopsy of the right breast 10:00 mass 7 cm from the nipple as well as the intramammary lymph node at the 9:00 region 7 centimeters from the nipple was performed on 2020.  The right breast 10:00 density came back as a high-grade infiltrating ductal cancer which was ER/TN negative HER-2/hugh 3+ by IHC with a Ki-67 of 80%.  The intramammary lymph node at the 9 o'clock position came back as a benign reactive lymph node.  The patient underwent a bilateral breast MRI in 2020 showing the known cancer in the right breast 10:00 region 7 cm from the nipple measuring 2.8 x 3.3 x 3.5 cm.  There was another area of concern in the right breast 9:00 region showing a possible ultrasound correlate 1 cm from the nipple and in the 10:00 region 12 cm from the nipple.  Ultrasound-guided core biopsy was performed in the 9:00 region 1 cm from the nipple which showed fibrocystic change and stromal fibrosis.  She underwent [facility] genetic panel testing in 2020 which was negative.  She underwent TCHP chemotherapy with Dr. Krueger and then underwent a follow-up MRI on 2020 showing a complete radiologic response to the right breast cancer.  She then underwent a right breast partial mastectomy with sentinel lymph node biopsy and bilateral reduction mastopexy's by Dr. Lees on 2020.  Final pathology showed 3 negative right axillary sentinel lymph nodes and the partial mastectomy showed an 8 mm residual focus of invasive high-grade ductal breast cancer which remained ER/TN negative HER-2/hugh 3+ positive.  She was downgraded from a clinical stage IIA to a pathologic prognostic stage IA breast cancer after neoadjuvant chemotherapy.  She underwent external beam radiation through Dr. Cervantes which finished in 2020 and was switched to Kadcyla by Dr. Krueger which finished in 2021 and then was placed on Femara for some reason even though the tumor was ER/TN negative.  On exam, she has typical postop and post radiation changes to the right breast but no evidence of recurrence.  The patient underwent her last bilateral mammography and ultrasound which was reviewed from [facility] performed on May 25, 2023 showing some pleomorphic calcifications toward the posterior upper aspect of the left breast and tomosynthesis guided stereotactic biopsy was performed on 2023 which showed benign calcifications and fibroadenomatoid changes which were felt to be concordant.  She was reassured and should obtain a follow-up diagnostic left breast mammography again around 2023 to follow-up on these calcifications and she was given a prescription.  I would like to see her again in 6 months around 2024 and she should remain on Femara through Dr. Diaz.  She will be due for her next bilateral mammography and ultrasound in May 2023 and she was given prescriptions.

## 2023-12-04 ENCOUNTER — RESULT REVIEW (OUTPATIENT)
Age: 63
End: 2023-12-04

## 2023-12-08 ENCOUNTER — NON-APPOINTMENT (OUTPATIENT)
Age: 63
End: 2023-12-08

## 2024-01-26 ENCOUNTER — NON-APPOINTMENT (OUTPATIENT)
Age: 64
End: 2024-01-26

## 2024-02-07 NOTE — HISTORY OF PRESENT ILLNESS
[FreeTextEntry1] : The patient is a 63-year-old  postmenopausal female of -American descent.  She underwent menarche at age 15 and had her first child at age 32.  She underwent menopause at age 49 and never took any hormone replacement therapy.  She has a strong family history with her maternal aunt who had breast cancer at age 65 and she has a maternal cousin who had breast cancer at age 49 and a paternal cousin had breast cancer at age 50.  The patient felt a mass towards the upper outer aspect of the right breast and underwent a mammography and ultrasound on March 3, 2020 showing a 2.9 x 2.2 x 2.2 cm mass in the right breast 10:00 region 7 cm from the nipple.  There was also a suspicious right axillary lymph node seen and a central right breast 9:00 intramammary lymph node.  Ultrasound-guided core biopsy of the right breast 10:00 mass 7 cm from the nipple as well as the intramammary lymph node at the 9:00 region 7 centimeters from the nipple was performed on 2020.  The right breast 10:00 density came back as a high-grade infiltrating ductal cancer which was ER/WV negative HER-2/hugh 3+ by IHC with a Ki-67 of 80%.  The intramammary lymph node at the 9 o'clock position came back as a benign reactive lymph node.  The patient underwent a bilateral breast MRI in 2020 showing the known cancer in the right breast 10:00 region 7 cm from the nipple measuring 2.8 x 3.3 x 3.5 cm.  There was another area of concern in the right breast 9:00 region showing a possible ultrasound correlate 1 cm from the nipple and in the 10:00 region 12 cm from the nipple.  Ultrasound-guided core biopsy was performed in the 9:00 region 1 cm from the nipple which is showed fibrocystic change and stromal fibrosis.  She underwent [facility] genetic panel testing in 2020 which was negative.  She underwent TCHP chemotherapy with Dr. Krueger and then underwent a follow-up MRI on 2020 showing a complete radiologic response to the right breast cancer.  She then underwent a right breast partial mastectomy with sentinel lymph node biopsy and bilateral reduction mastopexy's by Dr. Lees on 2020.  Final pathology showed 3 negative right axillary sentinel lymph nodes and the partial mastectomy showed an 8 mm residual focus of invasive high-grade ductal breast cancer which remained ER/WV negative HER-2/hugh 3+ positive.  She was downgraded from a clinical stage IIA to a pathologic prognostic stage IA breast cancer after neoadjuvant chemotherapy.  She underwent external beam radiation through Dr. Cervantes which finished in 2020 and was switched to Kadcyla by Dr. Krueger which she finished in 2021 and was started on Femara for some reason.  She did undergo a left breast lower outer quadrant stereotactic core biopsy for calcifications on 2023 at [facility] which were benign.  She comes in now for routine follow-up and continues to get yearly mammography and ultrasounds.

## 2024-02-07 NOTE — REVIEW OF SYSTEMS
[Constipation] : constipation [As Noted in HPI] : as noted in HPI [Negative] : Endocrine [FreeTextEntry2] : fATIGUE

## 2024-02-07 NOTE — REASON FOR VISIT
[Follow-Up: _____] : a [unfilled] follow-up visit [FreeTextEntry1] : The patient comes in with a history of a right breast upper outer quadrant high-grade invasive ductal cancer which was ER HI negative HER-2/hugh 3+ with a high Ki-67 diagnosed around March 2020 for which she underwent neoadjuvant TCHP chemotherapy with  and then underwent a right breast partial mastectomy and sentinel lymph node biopsy with bilateral reduction mastopexy by Dr. Griffiths on October 12, 2020.  She had 3 negative right axillary lymph nodes and a residual 8 mm focus of high-grade invasive duct cancer which was downgraded from a clinical stage IIA to a pathologic prognostic stage IA breast cancer after neoadjuvant chemotherapy.  She was switched to Kadcyla and underwent external beam radiation to the right breast and comes in for routine follow-up.

## 2024-02-07 NOTE — PHYSICAL EXAM
[Atraumatic] : atraumatic [Normocephalic] : normocephalic [EOMI] : extra ocular movement intact [No Supraclavicular Adenopathy] : no supraclavicular adenopathy [Supple] : supple [Normal Sinus Rhythm] : normal sinus rhythm [No Cervical Adenopathy] : no cervical adenopathy [No dominant masses] : no dominant masses in right breast  [Examined in the supine and seated position] : examined in the supine and seated position [No dominant masses] : no dominant masses left breast [No Nipple Retraction] : no left nipple retraction [No Nipple Discharge] : no left nipple discharge [Breast Mass Right Breast ___cm] : no masses [Breast Mass Left Breast ___cm] : no masses [No Axillary Lymphadenopathy] : no left axillary lymphadenopathy [No Edema] : no edema [No Rashes] : no rashes [No Ulceration] : no ulceration [Breast Nipple Inversion] : nipples not inverted [Breast Nipple Retraction] : nipples not retracted [Breast Nipple Flattening] : nipples not flattened [Breast Nipple Fissures] : nipples not fissured [Breast Abnormal Lactation (Galactorrhea)] : no galactorrhea [Breast Abnormal Secretion Bloody Fluid] : no bloody discharge [Breast Abnormal Secretion Serous Fluid] : no serous discharge [Breast Abnormal Secretion Opalescent Fluid] : no milky discharge [de-identified] : On exam, the patient has the obvious bilateral reduction mastopexy's.  She did have radiation to the right breast and does have some increased skin thickening.  I cannot feel any evidence of recurrence in the right breast and her left breast is negative.  She has no axillary, supraclavicular, or cervical adenopathy. [de-identified] : Status post partial mastectomy with reduction mastopexy and external beam radiation with some skin thickening and mild lymphedema [de-identified] : Status post reduction mastopexy for symmetry

## 2024-02-07 NOTE — ASSESSMENT
[FreeTextEntry1] : The patient is a 63-year-old  postmenopausal female of -American descent. She underwent menarche at age 15 and had her first child at age 32. She underwent menopause at age 49 and never took any hormone replacement therapy. She has a strong family history with her maternal aunt who had breast cancer at age 65 and she has a maternal cousin who had breast cancer at age 49 and a paternal cousin had breast cancer at age 50. The patient felt a mass towards the upper outer aspect of the right breast and underwent a mammography and ultrasound on March 3, 2020 showing a 2.9 x 2.2 x 2.2 cm mass in the right breast 10:00 region 7 cm from the nipple. There was also a suspicious right axillary lymph node seen and a central right breast 9:00 intramammary lymph node. Ultrasound-guided core biopsy of the right breast 10:00 mass 7 cm from the nipple as well as the intramammary lymph node at the 9:00 region 7 centimeters from the nipple was performed on 2020. The right breast 10:00 density came back as a high-grade infiltrating ductal cancer which was ER/KS negative HER-2/hugh 3+ by IHC with a Ki-67 of 80%. The intramammary lymph node at the 9 o'clock position came back as a benign reactive lymph node. The patient underwent a bilateral breast MRI on 2020 showing the known cancer in the right breast 10:00 region 7 cm from the nipple measuring 2.8 x 3.3 x 3.5 cm. There was another area of concern in the right breast 9:00 region showing a possible ultrasound correlate 1 cm from the nipple and in the 10:00 region 12 cm from the nipple. Ultrasound-guided core biopsy was performed in the 9:00 region 1 cm from the nipple which showed fibrocystic change and stromal fibrosis. She underwent [facility] genetic panel testing in 2020 which was negative. She underwent TCHP chemotherapy with Dr. Krueger and then underwent a follow-up MRI on 2020 showing a complete radiologic response to the right breast cancer. She then underwent a right breast partial mastectomy with sentinel lymph node biopsy and bilateral reduction mastopexy's by Dr. Lees on 2020. Final pathology showed 3 negative right axillary sentinel lymph nodes and the partial mastectomy showed an 8 mm residual focus of invasive high-grade ductal breast cancer which remained ER/KS negative HER-2/hugh 3+ positive. She was downgraded from a clinical stage IIA to a pathologic prognostic stage IA breast cancer after neoadjuvant chemotherapy. She underwent external beam radiation through Dr. Cervantes which finished in 2020 and was switched to Kadcyla by Dr. Krueger which finished in 2021 and then was placed on Femara for some reason even though the tumor was ER/KS negative. The patient underwent a bilateral mammography and ultrasound at [facility] performed on May 25, 2023 showing some pleomorphic calcifications toward the posterior upper aspect of the left breast and tomosynthesis guided stereotactic biopsy was performed on 2023 which showed benign calcifications and fibroadenomatoid changes which were felt to be concordant and follow-up diagnostic left breast mammography performed on 2023 and [facility] showed stable left breast lower outer quadrant posterior benign-appearing calcifications.  On exam, she has typical postop and post radiation changes to the right breast but no evidence of recurrence.  The patient was reassured and I would like to see her again in 6 months around 2024 and she should remain on Femara through Dr. Diaz. She will be due for her next bilateral mammography and ultrasound in May 2024 and she was given prescriptions.

## 2024-02-07 NOTE — PAST MEDICAL HISTORY
[Postmenopausal] : The patient is postmenopausal [Menopause Age____] : age at menopause was [unfilled] [Menarche Age ____] : age at menarche was [unfilled] [Total Preg ___] : G[unfilled] [Live Births ___] : P[unfilled]  [Age At Live Birth ___] : Age at live birth: [unfilled] [History of Hormone Replacement Treatment] : has no history of hormone replacement treatment

## 2024-02-12 ENCOUNTER — APPOINTMENT (OUTPATIENT)
Dept: BREAST CENTER | Facility: CLINIC | Age: 64
End: 2024-02-12
Payer: COMMERCIAL

## 2024-02-12 DIAGNOSIS — Z90.11 ACQUIRED ABSENCE OF RIGHT BREAST AND NIPPLE: ICD-10-CM

## 2024-02-12 DIAGNOSIS — Z80.3 FAMILY HISTORY OF MALIGNANT NEOPLASM OF BREAST: ICD-10-CM

## 2024-02-12 DIAGNOSIS — Z85.3 PERSONAL HISTORY OF MALIGNANT NEOPLASM OF BREAST: ICD-10-CM

## 2024-02-12 PROCEDURE — 99213 OFFICE O/P EST LOW 20 MIN: CPT

## 2024-02-12 NOTE — REASON FOR VISIT
[Follow-Up: _____] : a [unfilled] follow-up visit [FreeTextEntry1] : The patient comes in with a history of a right breast upper outer quadrant high-grade invasive ductal cancer which was ER GA negative HER-2/hugh 3+ with a high Ki-67 diagnosed around March 2020 for which she underwent neoadjuvant TCHP chemotherapy with  and then underwent a right breast partial mastectomy and sentinel lymph node biopsy with bilateral reduction mastopexy by Dr. Griffiths on October 12, 2020.  She had 3 negative right axillary lymph nodes and a residual 8 mm focus of high-grade invasive duct cancer which was downgraded from a clinical stage IIA to a pathologic prognostic stage IA breast cancer after neoadjuvant chemotherapy.  She was switched to Kadcyla and underwent external beam radiation to the right breast and comes in for routine follow-up.

## 2024-02-12 NOTE — PHYSICAL EXAM
[Normocephalic] : normocephalic [Atraumatic] : atraumatic [EOMI] : extra ocular movement intact [Supple] : supple [No Supraclavicular Adenopathy] : no supraclavicular adenopathy [No Cervical Adenopathy] : no cervical adenopathy [Normal Sinus Rhythm] : normal sinus rhythm [Examined in the supine and seated position] : examined in the supine and seated position [No dominant masses] : no dominant masses in right breast  [No dominant masses] : no dominant masses left breast [No Nipple Retraction] : no left nipple retraction [No Nipple Discharge] : no left nipple discharge [Breast Mass Right Breast ___cm] : no masses [Breast Mass Left Breast ___cm] : no masses [No Axillary Lymphadenopathy] : no left axillary lymphadenopathy [No Edema] : no edema [No Rashes] : no rashes [No Ulceration] : no ulceration [Breast Nipple Inversion] : nipples not inverted [Breast Nipple Retraction] : nipples not retracted [Breast Nipple Flattening] : nipples not flattened [Breast Nipple Fissures] : nipples not fissured [Breast Abnormal Lactation (Galactorrhea)] : no galactorrhea [Breast Abnormal Secretion Bloody Fluid] : no bloody discharge [Breast Abnormal Secretion Opalescent Fluid] : no milky discharge [Breast Abnormal Secretion Serous Fluid] : no serous discharge [de-identified] : On exam, the patient has the obvious bilateral reduction mastopexy's.  She did have radiation to the right breast and does have some increased skin thickening.  I cannot feel any evidence of recurrence in the right breast and her left breast is negative.  She has no axillary, supraclavicular, or cervical adenopathy. [de-identified] : Status post partial mastectomy with reduction mastopexy and external beam radiation with some skin thickening and mild lymphedema [de-identified] : Status post reduction mastopexy for symmetry

## 2024-02-12 NOTE — ASSESSMENT
[FreeTextEntry1] : The patient is a 63-year-old  postmenopausal female of -American descent. She underwent menarche at age 15 and had her first child at age 32. She underwent menopause at age 49 and never took any hormone replacement therapy. She has a strong family history with her maternal aunt who had breast cancer at age 65 and she has a maternal cousin who had breast cancer at age 49 and a paternal cousin had breast cancer at age 50. The patient felt a mass towards the upper outer aspect of the right breast and underwent a mammography and ultrasound on March 3, 2020 showing a 2.9 x 2.2 x 2.2 cm mass in the right breast 10:00 region 7 cm from the nipple. There was also a suspicious right axillary lymph node seen and a central right breast 9:00 intramammary lymph node. Ultrasound-guided core biopsy of the right breast 10:00 mass 7 cm from the nipple as well as the intramammary lymph node at the 9:00 region 7 centimeters from the nipple was performed on 2020. The right breast 10:00 density came back as a high-grade infiltrating ductal cancer which was ER/CT negative HER-2/hugh 3+ by IHC with a Ki-67 of 80%. The intramammary lymph node at the 9 o'clock position came back as a benign reactive lymph node. The patient underwent a bilateral breast MRI on 2020 showing the known cancer in the right breast 10:00 region 7 cm from the nipple measuring 2.8 x 3.3 x 3.5 cm. There was another area of concern in the right breast 9:00 region showing a possible ultrasound correlate 1 cm from the nipple and in the 10:00 region 12 cm from the nipple. Ultrasound-guided core biopsy was performed in the 9:00 region 1 cm from the nipple which showed fibrocystic change and stromal fibrosis. She underwent [facility] genetic panel testing in 2020 which was negative. She underwent TCHP chemotherapy with Dr. Krueger and then underwent a follow-up MRI on 2020 showing a complete radiologic response to the right breast cancer. She then underwent a right breast partial mastectomy with sentinel lymph node biopsy and bilateral reduction mastopexy's by Dr. Lees on 2020. Final pathology showed 3 negative right axillary sentinel lymph nodes and the partial mastectomy showed an 8 mm residual focus of invasive high-grade ductal breast cancer which remained ER/CT negative HER-2/hugh 3+ positive. She was downgraded from a clinical stage IIA to a pathologic prognostic stage IA breast cancer after neoadjuvant chemotherapy. She underwent external beam radiation through Dr. Cervantes which finished in 2020 and was switched to Kadcyla by Dr. Krueger which finished in 2021 and then was placed on Femara for some reason even though the tumor was ER/CT negative. The patient underwent a bilateral mammography and ultrasound at [facility] performed on May 25, 2023 showing some pleomorphic calcifications toward the posterior upper aspect of the left breast and tomosynthesis guided stereotactic biopsy was performed on 2023 which showed benign calcifications and fibroadenomatoid changes which were felt to be concordant and follow-up diagnostic left breast mammography performed on 2023 and [facility] showed stable left breast lower outer quadrant posterior benign-appearing calcifications.  On exam, she has typical postop and post radiation changes to the right breast but no evidence of recurrence.  The patient was reassured and I would like to see her again in 6 months around 2024 and she should remain on Femara through Dr. Diaz. She will be due for her next bilateral mammography and ultrasound in May 2024 and she was given prescriptions.

## 2024-05-06 ENCOUNTER — RESULT REVIEW (OUTPATIENT)
Age: 64
End: 2024-05-06

## 2024-07-02 DIAGNOSIS — Z91.89 OTHER SPECIFIED PERSONAL RISK FACTORS, NOT ELSEWHERE CLASSIFIED: ICD-10-CM

## 2024-07-25 ENCOUNTER — NON-APPOINTMENT (OUTPATIENT)
Age: 64
End: 2024-07-25

## 2024-08-01 NOTE — ASSESSMENT
[FreeTextEntry1] : The patient is a 64-year-old  postmenopausal female of -American descent. She underwent menarche at age 15 and had her first child at age 32. She underwent menopause at age 49 and never took any hormone replacement therapy. She has a strong family history with her maternal aunt who had breast cancer at age 65 and she has a maternal cousin who had breast cancer at age 49 and a paternal cousin had breast cancer at age 50. The patient felt a mass towards the upper outer aspect of the right breast and underwent a mammography and ultrasound on March 3, 2020 showing a 2.9 x 2.2 x 2.2 cm mass in the right breast 10:00 region 7 cm from the nipple. There was also a suspicious right axillary lymph node seen and a central right breast 9:00 intramammary lymph node. Ultrasound-guided core biopsy of the right breast 10:00 mass 7 cm from the nipple as well as the intramammary lymph node at the 9:00 region 7 centimeters from the nipple was performed on 2020. The right breast 10:00 density came back as a high-grade infiltrating ductal cancer which was ER/MI negative HER-2/hugh 3+ by IHC with a Ki-67 of 80%. The intramammary lymph node at the 9 o'clock position came back as a benign reactive lymph node. The patient underwent a bilateral breast MRI on 2020 showing the known cancer in the right breast 10:00 region 7 cm from the nipple measuring 2.8 x 3.3 x 3.5 cm. There was another area of concern in the right breast 9:00 region showing a possible ultrasound correlate 1 cm from the nipple and in the 10:00 region 12 cm from the nipple. Ultrasound-guided core biopsy was performed in the 9:00 region 1 cm from the nipple which showed fibrocystic change and stromal fibrosis. She underwent [facility] genetic panel testing in 2020 which was negative. She underwent TCHP chemotherapy with Dr. Krueger and then underwent a follow-up MRI on 2020 showing a complete radiologic response to the right breast cancer. She then underwent a right breast partial mastectomy with sentinel lymph node biopsy and bilateral reduction mastopexy's by Dr. Lees on 2020. Final pathology showed 3 negative right axillary sentinel lymph nodes and the partial mastectomy showed an 8 mm residual focus of invasive high-grade ductal breast cancer which remained ER/MI negative HER-2/hugh 3+ positive. She was downgraded from a clinical stage IIA to a pathologic prognostic stage IA breast cancer after neoadjuvant chemotherapy. She underwent external beam radiation through Dr. Cervantes which finished in 2020 and was switched to Kadcyla by Dr. Krueger which finished in 2021 and then was placed on Femara for some reason even though the tumor was ER/MI negative. The patient underwent a bilateral mammography and ultrasound at [facility] performed on May 25, 2023 showing some pleomorphic calcifications toward the posterior upper aspect of the left breast and tomosynthesis guided stereotactic biopsy was performed on 2023 which showed benign calcifications and fibroadenomatoid changes which were felt to be concordant and follow-up diagnostic left breast mammography performed on 2023 and [facility] showed stable left breast lower outer quadrant posterior benign-appearing calcifications.  She underwent her last bilateral mammography and ultrasound which was reviewed from May 7, 2024 and performed at [facility] which showed stable probably benign calcifications remaining in the lower outer aspect of the left breast however follow-up diagnostic left breast mammography was recommended again in 6 months.  On exam, she has typical postop and post radiation changes to the right breast but no evidence of recurrence.  The patient was reassured and I would like to see her again in 6 months around 2025 and she should remain on Femara through Dr. Diaz. She will be due for another follow-up diagnostic left breast mammography in 2024 and she was given a prescription.  If this is unchanged and negative, her next bilateral mammography and ultrasound will be due in May 2025.

## 2024-08-01 NOTE — HISTORY OF PRESENT ILLNESS
[FreeTextEntry1] : The patient is a 64-year-old  postmenopausal female of -American descent.  She underwent menarche at age 15 and had her first child at age 32.  She underwent menopause at age 49 and never took any hormone replacement therapy.  She has a strong family history with her maternal aunt who had breast cancer at age 65 and she has a maternal cousin who had breast cancer at age 49 and a paternal cousin had breast cancer at age 50.  The patient felt a mass towards the upper outer aspect of the right breast and underwent a mammography and ultrasound on March 3, 2020 showing a 2.9 x 2.2 x 2.2 cm mass in the right breast 10:00 region 7 cm from the nipple.  There was also a suspicious right axillary lymph node seen and a central right breast 9:00 intramammary lymph node.  Ultrasound-guided core biopsy of the right breast 10:00 mass 7 cm from the nipple as well as the intramammary lymph node at the 9:00 region 7 centimeters from the nipple was performed on 2020.  The right breast 10:00 density came back as a high-grade infiltrating ductal cancer which was ER/MS negative HER-2/hugh 3+ by IHC with a Ki-67 of 80%.  The intramammary lymph node at the 9 o'clock position came back as a benign reactive lymph node.  The patient underwent a bilateral breast MRI in 2020 showing the known cancer in the right breast 10:00 region 7 cm from the nipple measuring 2.8 x 3.3 x 3.5 cm.  There was another area of concern in the right breast 9:00 region showing a possible ultrasound correlate 1 cm from the nipple and in the 10:00 region 12 cm from the nipple.  Ultrasound-guided core biopsy was performed in the 9:00 region 1 cm from the nipple which is showed fibrocystic change and stromal fibrosis.  She underwent [facility] genetic panel testing in 2020 which was negative.  She underwent TCHP chemotherapy with Dr. Krueger and then underwent a follow-up MRI on 2020 showing a complete radiologic response to the right breast cancer.  She then underwent a right breast partial mastectomy with sentinel lymph node biopsy and bilateral reduction mastopexy's by Dr. Lees on 2020.  Final pathology showed 3 negative right axillary sentinel lymph nodes and the partial mastectomy showed an 8 mm residual focus of invasive high-grade ductal breast cancer which remained ER/MS negative HER-2/hugh 3+ positive.  She was downgraded from a clinical stage IIA to a pathologic prognostic stage IA breast cancer after neoadjuvant chemotherapy.  She underwent external beam radiation through Dr. Cervantes which finished in 2020 and was switched to Kadcyla by Dr. Krueger which she finished in 2021 and was started on Femara for some reason.  She did undergo a left breast lower outer quadrant stereotactic core biopsy for calcifications on 2023 at [facility] which were benign.  She comes in now for routine follow-up and continues to get yearly mammography and ultrasounds.

## 2024-08-01 NOTE — REASON FOR VISIT
[Follow-Up: _____] : a [unfilled] follow-up visit [FreeTextEntry1] : The patient comes in with a history of a right breast upper outer quadrant high-grade invasive ductal cancer which was ER NE negative HER-2/hugh 3+ with a high Ki-67 diagnosed around March 2020 for which she underwent neoadjuvant TCHP chemotherapy with  then underwent a right breast partial mastectomy and sentinel lymph node biopsy with bilateral reduction mastopexy by Dr. Lees on October 12, 2020.  She had 3 negative right axillary lymph nodes and a residual 8 mm focus of high-grade invasive duct cancer which was downgraded from a clinical stage IIA to a pathologic prognostic stage IA breast cancer after neoadjuvant chemotherapy.  She was switched to Kadcyla and underwent external beam radiation to the right breast and comes in for routine follow-up.

## 2024-08-01 NOTE — PHYSICAL EXAM
[Normocephalic] : normocephalic [Atraumatic] : atraumatic [EOMI] : extra ocular movement intact [Supple] : supple [No Supraclavicular Adenopathy] : no supraclavicular adenopathy [No Cervical Adenopathy] : no cervical adenopathy [Normal Sinus Rhythm] : normal sinus rhythm [Examined in the supine and seated position] : examined in the supine and seated position [No dominant masses] : no dominant masses in right breast  [No dominant masses] : no dominant masses left breast [No Nipple Retraction] : no left nipple retraction [No Nipple Discharge] : no left nipple discharge [Breast Mass Right Breast ___cm] : no masses [Breast Mass Left Breast ___cm] : no masses [No Axillary Lymphadenopathy] : no left axillary lymphadenopathy [No Edema] : no edema [No Rashes] : no rashes [No Ulceration] : no ulceration [Breast Nipple Inversion] : nipples not inverted [Breast Nipple Retraction] : nipples not retracted [Breast Nipple Flattening] : nipples not flattened [Breast Nipple Fissures] : nipples not fissured [Breast Abnormal Lactation (Galactorrhea)] : no galactorrhea [Breast Abnormal Secretion Bloody Fluid] : no bloody discharge [Breast Abnormal Secretion Serous Fluid] : no serous discharge [Breast Abnormal Secretion Opalescent Fluid] : no milky discharge [de-identified] : On exam, the patient has the obvious bilateral reduction mastopexy's.  She did have radiation to the right breast and does have some increased skin thickening.  I cannot feel any evidence of recurrence in the right breast and her left breast is negative.  She has no axillary, supraclavicular, or cervical adenopathy. [de-identified] : Status post partial mastectomy with reduction mastopexy and external beam radiation with some skin thickening and mild lymphedema [de-identified] : Status post reduction mastopexy for symmetry

## 2024-08-07 NOTE — REASON FOR VISIT
[Follow-Up: _____] : a [unfilled] follow-up visit [FreeTextEntry1] : The patient comes in with a history of a right breast upper outer quadrant high-grade invasive ductal cancer which was ER OK negative HER-2/hugh 3+ with a high Ki-67 diagnosed around March 2020 for which she underwent neoadjuvant TCHP chemotherapy with  then underwent a right breast partial mastectomy and sentinel lymph node biopsy with bilateral reduction mastopexy by Dr. Lees on October 12, 2020.  She had 3 negative right axillary lymph nodes and a residual 8 mm focus of high-grade invasive duct cancer which was downgraded from a clinical stage IIA to a pathologic prognostic stage IA breast cancer after neoadjuvant chemotherapy.  She was switched to Kadcyla and underwent external beam radiation to the right breast and comes in for routine follow-up.

## 2024-08-07 NOTE — ASSESSMENT
[FreeTextEntry1] : The patient is a 64-year-old  postmenopausal female of -American descent. She underwent menarche at age 15 and had her first child at age 32. She underwent menopause at age 49 and never took any hormone replacement therapy. She has a strong family history with her maternal aunt who had breast cancer at age 65 and she has a maternal cousin who had breast cancer at age 49 and a paternal cousin had breast cancer at age 50. The patient felt a mass towards the upper outer aspect of the right breast and underwent a mammography and ultrasound on March 3, 2020 showing a 2.9 x 2.2 x 2.2 cm mass in the right breast 10:00 region 7 cm from the nipple. There was also a suspicious right axillary lymph node seen and a central right breast 9:00 intramammary lymph node. Ultrasound-guided core biopsy of the right breast 10:00 mass 7 cm from the nipple as well as the intramammary lymph node at the 9:00 region 7 centimeters from the nipple was performed on 2020. The right breast 10:00 density came back as a high-grade infiltrating ductal cancer which was ER/HI negative HER-2/hugh 3+ by IHC with a Ki-67 of 80%. The intramammary lymph node at the 9 o'clock position came back as a benign reactive lymph node. The patient underwent a bilateral breast MRI on 2020 showing the known cancer in the right breast 10:00 region 7 cm from the nipple measuring 2.8 x 3.3 x 3.5 cm. There was another area of concern in the right breast 9:00 region showing a possible ultrasound correlate 1 cm from the nipple and in the 10:00 region 12 cm from the nipple. Ultrasound-guided core biopsy was performed in the 9:00 region 1 cm from the nipple which showed fibrocystic change and stromal fibrosis. She underwent [facility] genetic panel testing in 2020 which was negative. She underwent TCHP chemotherapy with Dr. Krueger and then underwent a follow-up MRI on 2020 showing a complete radiologic response to the right breast cancer. She then underwent a right breast partial mastectomy with sentinel lymph node biopsy and bilateral reduction mastopexy's by Dr. Lees on 2020. Final pathology showed 3 negative right axillary sentinel lymph nodes and the partial mastectomy showed an 8 mm residual focus of invasive high-grade ductal breast cancer which remained ER/HI negative HER-2/hugh 3+ positive. She was downgraded from a clinical stage IIA to a pathologic prognostic stage IA breast cancer after neoadjuvant chemotherapy. She underwent external beam radiation through Dr. Cervantes which finished in 2020 and was switched to Kadcyla by Dr. Krueger which finished in 2021 and then was placed on Femara for some reason even though the tumor was ER/HI negative. The patient underwent a bilateral mammography and ultrasound at [facility] performed on May 25, 2023 showing some pleomorphic calcifications toward the posterior upper aspect of the left breast and tomosynthesis guided stereotactic biopsy was performed on 2023 which showed benign calcifications and fibroadenomatoid changes which were felt to be concordant and follow-up diagnostic left breast mammography performed on 2023 and [facility] showed stable left breast lower outer quadrant posterior benign-appearing calcifications.  She underwent her last bilateral mammography and ultrasound which was reviewed from May 7, 2024 and performed at [facility] which showed stable probably benign calcifications remaining in the lower outer aspect of the left breast however follow-up diagnostic left breast mammography was recommended again in 6 months.  On exam, she has typical postop and post radiation changes to the right breast but no evidence of recurrence.  The patient was reassured and I would like to see her again in 6 months around 2025 and she should remain on Femara through Dr. Diaz. She will be due for another follow-up diagnostic left breast mammography in 2024 and she was given a prescription.  If this is unchanged and negative, her next bilateral mammography and ultrasound will be due in May 2025.

## 2024-08-07 NOTE — PHYSICAL EXAM
[Normocephalic] : normocephalic [Atraumatic] : atraumatic [EOMI] : extra ocular movement intact [No Supraclavicular Adenopathy] : no supraclavicular adenopathy [Supple] : supple [No Cervical Adenopathy] : no cervical adenopathy [Normal Sinus Rhythm] : normal sinus rhythm [Examined in the supine and seated position] : examined in the supine and seated position [No dominant masses] : no dominant masses in right breast  [No dominant masses] : no dominant masses left breast [No Nipple Retraction] : no left nipple retraction [No Nipple Discharge] : no left nipple discharge [Breast Mass Right Breast ___cm] : no masses [Breast Mass Left Breast ___cm] : no masses [No Axillary Lymphadenopathy] : no left axillary lymphadenopathy [No Edema] : no edema [No Rashes] : no rashes [No Ulceration] : no ulceration [Breast Nipple Inversion] : nipples not inverted [Breast Nipple Retraction] : nipples not retracted [Breast Nipple Flattening] : nipples not flattened [Breast Nipple Fissures] : nipples not fissured [Breast Abnormal Lactation (Galactorrhea)] : no galactorrhea [Breast Abnormal Secretion Bloody Fluid] : no bloody discharge [Breast Abnormal Secretion Serous Fluid] : no serous discharge [Breast Abnormal Secretion Opalescent Fluid] : no milky discharge [de-identified] : On exam, the patient has the obvious bilateral reduction mastopexy's.  She did have radiation to the right breast and does have some increased skin thickening.  I cannot feel any evidence of recurrence in the right breast and her left breast is negative.  She has no axillary, supraclavicular, or cervical adenopathy. [de-identified] : Status post partial mastectomy with reduction mastopexy and external beam radiation with some skin thickening and mild lymphedema [de-identified] : Status post reduction mastopexy for symmetry

## 2024-08-07 NOTE — HISTORY OF PRESENT ILLNESS
[FreeTextEntry1] : The patient is a 64-year-old  postmenopausal female of -American descent.  She underwent menarche at age 15 and had her first child at age 32.  She underwent menopause at age 49 and never took any hormone replacement therapy.  She has a strong family history with her maternal aunt who had breast cancer at age 65 and she has a maternal cousin who had breast cancer at age 49 and a paternal cousin had breast cancer at age 50.  The patient felt a mass towards the upper outer aspect of the right breast and underwent a mammography and ultrasound on March 3, 2020 showing a 2.9 x 2.2 x 2.2 cm mass in the right breast 10:00 region 7 cm from the nipple.  There was also a suspicious right axillary lymph node seen and a central right breast 9:00 intramammary lymph node.  Ultrasound-guided core biopsy of the right breast 10:00 mass 7 cm from the nipple as well as the intramammary lymph node at the 9:00 region 7 centimeters from the nipple was performed on 2020.  The right breast 10:00 density came back as a high-grade infiltrating ductal cancer which was ER/AZ negative HER-2/hugh 3+ by IHC with a Ki-67 of 80%.  The intramammary lymph node at the 9 o'clock position came back as a benign reactive lymph node.  The patient underwent a bilateral breast MRI in 2020 showing the known cancer in the right breast 10:00 region 7 cm from the nipple measuring 2.8 x 3.3 x 3.5 cm.  There was another area of concern in the right breast 9:00 region showing a possible ultrasound correlate 1 cm from the nipple and in the 10:00 region 12 cm from the nipple.  Ultrasound-guided core biopsy was performed in the 9:00 region 1 cm from the nipple which is showed fibrocystic change and stromal fibrosis.  She underwent [facility] genetic panel testing in 2020 which was negative.  She underwent TCHP chemotherapy with Dr. Krueger and then underwent a follow-up MRI on 2020 showing a complete radiologic response to the right breast cancer.  She then underwent a right breast partial mastectomy with sentinel lymph node biopsy and bilateral reduction mastopexy's by Dr. Lees on 2020.  Final pathology showed 3 negative right axillary sentinel lymph nodes and the partial mastectomy showed an 8 mm residual focus of invasive high-grade ductal breast cancer which remained ER/AZ negative HER-2/hugh 3+ positive.  She was downgraded from a clinical stage IIA to a pathologic prognostic stage IA breast cancer after neoadjuvant chemotherapy.  She underwent external beam radiation through Dr. Cervantes which finished in 2020 and was switched to Kadcyla by Dr. Krueger which she finished in 2021 and was started on Femara for some reason.  She did undergo a left breast lower outer quadrant stereotactic core biopsy for calcifications on 2023 at [facility] which were benign.  She comes in now for routine follow-up and continues to get yearly mammography and ultrasounds.

## 2024-08-07 NOTE — PHYSICAL EXAM
[Normocephalic] : normocephalic [Atraumatic] : atraumatic [EOMI] : extra ocular movement intact [No Supraclavicular Adenopathy] : no supraclavicular adenopathy [Supple] : supple [No Cervical Adenopathy] : no cervical adenopathy [Normal Sinus Rhythm] : normal sinus rhythm [Examined in the supine and seated position] : examined in the supine and seated position [No dominant masses] : no dominant masses in right breast  [No dominant masses] : no dominant masses left breast [No Nipple Retraction] : no left nipple retraction [No Nipple Discharge] : no left nipple discharge [Breast Mass Right Breast ___cm] : no masses [Breast Mass Left Breast ___cm] : no masses [No Axillary Lymphadenopathy] : no left axillary lymphadenopathy [No Edema] : no edema [No Rashes] : no rashes [No Ulceration] : no ulceration [Breast Nipple Inversion] : nipples not inverted [Breast Nipple Retraction] : nipples not retracted [Breast Nipple Flattening] : nipples not flattened [Breast Nipple Fissures] : nipples not fissured [Breast Abnormal Lactation (Galactorrhea)] : no galactorrhea [Breast Abnormal Secretion Bloody Fluid] : no bloody discharge [Breast Abnormal Secretion Serous Fluid] : no serous discharge [Breast Abnormal Secretion Opalescent Fluid] : no milky discharge [de-identified] : On exam, the patient has the obvious bilateral reduction mastopexy's.  She did have radiation to the right breast and does have some increased skin thickening.  I cannot feel any evidence of recurrence in the right breast and her left breast is negative.  She has no axillary, supraclavicular, or cervical adenopathy. [de-identified] : Status post partial mastectomy with reduction mastopexy and external beam radiation with some skin thickening and mild lymphedema [de-identified] : Status post reduction mastopexy for symmetry

## 2024-08-07 NOTE — ASSESSMENT
[FreeTextEntry1] : The patient is a 64-year-old  postmenopausal female of -American descent. She underwent menarche at age 15 and had her first child at age 32. She underwent menopause at age 49 and never took any hormone replacement therapy. She has a strong family history with her maternal aunt who had breast cancer at age 65 and she has a maternal cousin who had breast cancer at age 49 and a paternal cousin had breast cancer at age 50. The patient felt a mass towards the upper outer aspect of the right breast and underwent a mammography and ultrasound on March 3, 2020 showing a 2.9 x 2.2 x 2.2 cm mass in the right breast 10:00 region 7 cm from the nipple. There was also a suspicious right axillary lymph node seen and a central right breast 9:00 intramammary lymph node. Ultrasound-guided core biopsy of the right breast 10:00 mass 7 cm from the nipple as well as the intramammary lymph node at the 9:00 region 7 centimeters from the nipple was performed on 2020. The right breast 10:00 density came back as a high-grade infiltrating ductal cancer which was ER/AZ negative HER-2/hugh 3+ by IHC with a Ki-67 of 80%. The intramammary lymph node at the 9 o'clock position came back as a benign reactive lymph node. The patient underwent a bilateral breast MRI on 2020 showing the known cancer in the right breast 10:00 region 7 cm from the nipple measuring 2.8 x 3.3 x 3.5 cm. There was another area of concern in the right breast 9:00 region showing a possible ultrasound correlate 1 cm from the nipple and in the 10:00 region 12 cm from the nipple. Ultrasound-guided core biopsy was performed in the 9:00 region 1 cm from the nipple which showed fibrocystic change and stromal fibrosis. She underwent [facility] genetic panel testing in 2020 which was negative. She underwent TCHP chemotherapy with Dr. Krueger and then underwent a follow-up MRI on 2020 showing a complete radiologic response to the right breast cancer. She then underwent a right breast partial mastectomy with sentinel lymph node biopsy and bilateral reduction mastopexy's by Dr. Lees on 2020. Final pathology showed 3 negative right axillary sentinel lymph nodes and the partial mastectomy showed an 8 mm residual focus of invasive high-grade ductal breast cancer which remained ER/AZ negative HER-2/hugh 3+ positive. She was downgraded from a clinical stage IIA to a pathologic prognostic stage IA breast cancer after neoadjuvant chemotherapy. She underwent external beam radiation through Dr. Cervantes which finished in 2020 and was switched to Kadcyla by Dr. Krueger which finished in 2021 and then was placed on Femara for some reason even though the tumor was ER/AZ negative. The patient underwent a bilateral mammography and ultrasound at [facility] performed on May 25, 2023 showing some pleomorphic calcifications toward the posterior upper aspect of the left breast and tomosynthesis guided stereotactic biopsy was performed on 2023 which showed benign calcifications and fibroadenomatoid changes which were felt to be concordant and follow-up diagnostic left breast mammography performed on 2023 and [facility] showed stable left breast lower outer quadrant posterior benign-appearing calcifications.  She underwent her last bilateral mammography and ultrasound which was reviewed from May 7, 2024 and performed at [facility] which showed stable probably benign calcifications remaining in the lower outer aspect of the left breast however follow-up diagnostic left breast mammography was recommended again in 6 months.  On exam, she has typical postop and post radiation changes to the right breast but no evidence of recurrence.  The patient was reassured and I would like to see her again in 6 months around 2025 and she should remain on Femara through Dr. Diaz. She will be due for another follow-up diagnostic left breast mammography in 2024 and she was given a prescription.  If this is unchanged and negative, her next bilateral mammography and ultrasound will be due in May 2025.

## 2024-08-07 NOTE — REASON FOR VISIT
[Follow-Up: _____] : a [unfilled] follow-up visit [FreeTextEntry1] : The patient comes in with a history of a right breast upper outer quadrant high-grade invasive ductal cancer which was ER [place] negative HER-2/hugh 3+ with a high Ki-67 diagnosed around March 2020 for which she underwent neoadjuvant TCHP chemotherapy with  then underwent a right breast partial mastectomy and sentinel lymph node biopsy with bilateral reduction mastopexy by Dr. Lees on October 12, 2020.  She had 3 negative right axillary lymph nodes and a residual 8 mm focus of high-grade invasive duct cancer which was downgraded from a clinical stage IIA to a pathologic prognostic stage IA breast cancer after neoadjuvant chemotherapy.  She was switched to Kadcyla and underwent external beam radiation to the right breast and comes in for routine follow-up.

## 2024-08-07 NOTE — REVIEW OF SYSTEMS
[As Noted in HPI] : as noted in HPI [Constipation] : constipation [Negative] : Endocrine [FreeTextEntry2] : fATIGUE

## 2024-08-07 NOTE — HISTORY OF PRESENT ILLNESS
[FreeTextEntry1] : The patient is a 64-year-old  postmenopausal female of -American descent.  She underwent menarche at age 15 and had her first child at age 32.  She underwent menopause at age 49 and never took any hormone replacement therapy.  She has a strong family history with her maternal aunt who had breast cancer at age 65 and she has a maternal cousin who had breast cancer at age 49 and a paternal cousin had breast cancer at age 50.  The patient felt a mass towards the upper outer aspect of the right breast and underwent a mammography and ultrasound on March 3, 2020 showing a 2.9 x 2.2 x 2.2 cm mass in the right breast 10:00 region 7 cm from the nipple.  There was also a suspicious right axillary lymph node seen and a central right breast 9:00 intramammary lymph node.  Ultrasound-guided core biopsy of the right breast 10:00 mass 7 cm from the nipple as well as the intramammary lymph node at the 9:00 region 7 centimeters from the nipple was performed on 2020.  The right breast 10:00 density came back as a high-grade infiltrating ductal cancer which was ER/NM negative HER-2/hugh 3+ by IHC with a Ki-67 of 80%.  The intramammary lymph node at the 9 o'clock position came back as a benign reactive lymph node.  The patient underwent a bilateral breast MRI in 2020 showing the known cancer in the right breast 10:00 region 7 cm from the nipple measuring 2.8 x 3.3 x 3.5 cm.  There was another area of concern in the right breast 9:00 region showing a possible ultrasound correlate 1 cm from the nipple and in the 10:00 region 12 cm from the nipple.  Ultrasound-guided core biopsy was performed in the 9:00 region 1 cm from the nipple which is showed fibrocystic change and stromal fibrosis.  She underwent [facility] genetic panel testing in 2020 which was negative.  She underwent TCHP chemotherapy with Dr. Krueger and then underwent a follow-up MRI on 2020 showing a complete radiologic response to the right breast cancer.  She then underwent a right breast partial mastectomy with sentinel lymph node biopsy and bilateral reduction mastopexy's by Dr. Lees on 2020.  Final pathology showed 3 negative right axillary sentinel lymph nodes and the partial mastectomy showed an 8 mm residual focus of invasive high-grade ductal breast cancer which remained ER/NM negative HER-2/hugh 3+ positive.  She was downgraded from a clinical stage IIA to a pathologic prognostic stage IA breast cancer after neoadjuvant chemotherapy.  She underwent external beam radiation through Dr. Cervantes which finished in 2020 and was switched to Kadcyla by Dr. Krueger which she finished in 2021 and was started on Femara for some reason.  She did undergo a left breast lower outer quadrant stereotactic core biopsy for calcifications on 2023 at [facility] which were benign.  She comes in now for routine follow-up and continues to get yearly mammography and ultrasounds.

## 2024-08-13 ENCOUNTER — RESULT REVIEW (OUTPATIENT)
Age: 64
End: 2024-08-13

## 2024-08-15 ENCOUNTER — NON-APPOINTMENT (OUTPATIENT)
Age: 64
End: 2024-08-15

## 2024-08-15 DIAGNOSIS — R92.8 OTHER ABNORMAL AND INCONCLUSIVE FINDINGS ON DIAGNOSTIC IMAGING OF BREAST: ICD-10-CM

## 2024-08-21 ENCOUNTER — RESULT REVIEW (OUTPATIENT)
Age: 64
End: 2024-08-21

## 2024-08-30 ENCOUNTER — APPOINTMENT (OUTPATIENT)
Dept: BREAST CENTER | Facility: CLINIC | Age: 64
End: 2024-08-30

## 2024-09-05 ENCOUNTER — APPOINTMENT (OUTPATIENT)
Dept: BREAST CENTER | Facility: CLINIC | Age: 64
End: 2024-09-05
Payer: COMMERCIAL

## 2024-09-05 VITALS
BODY MASS INDEX: 27.66 KG/M2 | WEIGHT: 162 LBS | HEART RATE: 83 BPM | DIASTOLIC BLOOD PRESSURE: 88 MMHG | HEIGHT: 64 IN | SYSTOLIC BLOOD PRESSURE: 136 MMHG | OXYGEN SATURATION: 98 %

## 2024-09-05 DIAGNOSIS — R92.0 MAMMOGRAPHIC MICROCALCIFICATION FOUND ON DIAGNOSTIC IMAGING OF BREAST: ICD-10-CM

## 2024-09-05 DIAGNOSIS — Z85.3 PERSONAL HISTORY OF MALIGNANT NEOPLASM OF BREAST: ICD-10-CM

## 2024-09-05 DIAGNOSIS — Z90.11 ACQUIRED ABSENCE OF RIGHT BREAST AND NIPPLE: ICD-10-CM

## 2024-09-05 DIAGNOSIS — Z80.3 FAMILY HISTORY OF MALIGNANT NEOPLASM OF BREAST: ICD-10-CM

## 2024-09-05 DIAGNOSIS — Z12.39 ENCOUNTER FOR OTHER SCREENING FOR MALIGNANT NEOPLASM OF BREAST: ICD-10-CM

## 2024-09-05 PROCEDURE — 99213 OFFICE O/P EST LOW 20 MIN: CPT

## 2024-09-05 NOTE — HISTORY OF PRESENT ILLNESS
[FreeTextEntry1] : The patient is a 64-year-old  postmenopausal female of -American descent.  She underwent menarche at age 15 and had her first child at age 32.  She underwent menopause at age 49 and never took any hormone replacement therapy.  She has a strong family history with her maternal aunt who had breast cancer at age 65 and she has a maternal cousin who had breast cancer at age 49 and a paternal cousin had breast cancer at age 50.  The patient felt a mass towards the upper outer aspect of the right breast and underwent a mammography and ultrasound on March 3, 2020 showing a 2.9 x 2.2 x 2.2 cm mass in the right breast 10:00 region 7 cm from the nipple.  There was also a suspicious right axillary lymph node seen and a central right breast 9:00 intramammary lymph node.  Ultrasound-guided core biopsy of the right breast 10:00 mass 7 cm from the nipple as well as the intramammary lymph node at the 9:00 region 7 centimeters from the nipple was performed on 2020.  The right breast 10:00 density came back as a high-grade infiltrating ductal cancer which was ER/AL negative HER-2/hugh 3+ by IHC with a Ki-67 of 80%.  The intramammary lymph node at the 9 o'clock position came back as a benign reactive lymph node.  The patient underwent a bilateral breast MRI in 2020 showing the known cancer in the right breast 10:00 region 7 cm from the nipple measuring 2.8 x 3.3 x 3.5 cm.  There was another area of concern in the right breast 9:00 region showing a possible ultrasound correlate 1 cm from the nipple and in the 10:00 region 12 cm from the nipple.  Ultrasound-guided core biopsy was performed in the 9:00 region 1 cm from the nipple which is showed fibrocystic change and stromal fibrosis.  She underwent [facility] genetic panel testing in 2020 which was negative.  She underwent TCHP chemotherapy with Dr. Krueger and then underwent a follow-up MRI on 2020 showing a complete radiologic response to the right breast cancer.  She then underwent a right breast partial mastectomy with sentinel lymph node biopsy and bilateral reduction mastopexy's by Dr. Lees on 2020.  Final pathology showed 3 negative right axillary sentinel lymph nodes and the partial mastectomy showed an 8 mm residual focus of invasive high-grade ductal breast cancer which remained ER/AL negative HER-2/hugh 3+ positive.  She was downgraded from a clinical stage IIA to a pathologic prognostic stage IA breast cancer after neoadjuvant chemotherapy.  She underwent external beam radiation through Dr. Cervantes which finished in 2020 and was switched to Kadcyla by Dr. Krueger which she finished in 2021 and was started on Femara for some reason.  She did undergo a left breast lower outer quadrant stereotactic core biopsy for calcifications on 2023 at [facility] which were benign.  She comes in now for routine follow-up and continues to get yearly mammography and ultrasounds.

## 2024-09-05 NOTE — ASSESSMENT
[FreeTextEntry1] : The patient is a 64-year-old  postmenopausal female of -American descent. She underwent menarche at age 15 and had her first child at age 32. She underwent menopause at age 49 and never took any hormone replacement therapy. She has a strong family history with her maternal aunt who had breast cancer at age 65 and she has a maternal cousin who had breast cancer at age 49 and a paternal cousin had breast cancer at age 50. The patient felt a mass towards the upper outer aspect of the right breast and underwent a mammography and ultrasound on March 3, 2020 showing a 2.9 x 2.2 x 2.2 cm mass in the right breast 10:00 region 7 cm from the nipple. There was also a suspicious right axillary lymph node seen and a central right breast 9:00 intramammary lymph node. Ultrasound-guided core biopsy of the right breast 10:00 mass 7 cm from the nipple as well as the intramammary lymph node at the 9:00 region 7 centimeters from the nipple was performed on 2020. The right breast 10:00 density came back as a high-grade infiltrating ductal cancer which was ER/ID negative HER-2/hugh 3+ by IHC with a Ki-67 of 80%. The intramammary lymph node at the 9 o'clock position came back as a benign reactive lymph node. The patient underwent a bilateral breast MRI on 2020 showing the known cancer in the right breast 10:00 region 7 cm from the nipple measuring 2.8 x 3.3 x 3.5 cm. There was another area of concern in the right breast 9:00 region showing a possible ultrasound correlate 1 cm from the nipple and in the 10:00 region 12 cm from the nipple. Ultrasound-guided core biopsy was performed in the 9:00 region 1 cm from the nipple which showed fibrocystic change and stromal fibrosis. She underwent [facility] genetic panel testing in 2020 which was negative. She underwent TCHP chemotherapy with Dr. Krueger and then underwent a follow-up MRI on 2020 showing a complete radiologic response to the right breast cancer. She then underwent a right breast partial mastectomy with sentinel lymph node biopsy and bilateral reduction mastopexy's by Dr. Lees on 2020. Final pathology showed 3 negative right axillary sentinel lymph nodes and the partial mastectomy showed an 8 mm residual focus of invasive high-grade ductal breast cancer which remained ER/ID negative HER-2/hugh 3+ positive. She was downgraded from a clinical stage IIA to a pathologic prognostic stage IA breast cancer after neoadjuvant chemotherapy. She underwent external beam radiation through Dr. Cervantes which finished in 2020 and was switched to Kadcyla by Dr. Krueger which finished in 2021 and then was placed on Femara for some reason even though the tumor was ER/ID negative. The patient underwent a bilateral mammography and ultrasound at [facility] performed on May 25, 2023 showing some pleomorphic calcifications toward the posterior upper aspect of the left breast and tomosynthesis guided stereotactic biopsy was performed on 2023 which showed benign calcifications and fibroadenomatoid changes which were felt to be concordant and follow-up diagnostic left breast mammography performed on 2023 and [facility] showed stable left breast lower outer quadrant posterior benign-appearing calcifications.  She underwent her last bilateral mammography and ultrasound which was reviewed from May 7, 2024 and performed at [facility] which showed stable probably benign calcifications remaining in the lower outer aspect of the left breast however follow-up diagnostic left breast mammography was recommended at the time of her annual mammography in May 2025.  She did undergo a screening bilateral breast MRI which was reviewed from 2024 and performed at [facility] which is showed some fat necrosis towards the high upper inner aspect of the right breast on the chest wall with the patient had a prior superficial erythematous region which has since resolved.  A diagnostic right breast mammography was performed on 2024 just showing findings consistent with fat necrosis.  On exam, she has typical postop and post radiation changes to the right breast but no evidence of recurrence.  The area of fat necrosis on the upper right chest wall is just barely palpable at this time and she no longer has any erythema.  The patient was reassured and I would like to see her again in 6 months around 2025 and she should remain on Femara through Dr. Diaz. She will be due for her routine bilateral mammography and ultrasound again in May 2025 and she was given a prescriptions.  The radiologist was asking for another MRI in 6 months but given the fact that this area has clinically improved and is just consistent with fat necrosis I see no need for a 6-month MRI and she can just undergo another MRI in 2025 to stagger her studies.  The patient understands and agrees to proceed as planned.

## 2024-09-05 NOTE — ASSESSMENT
[FreeTextEntry1] : The patient is a 64-year-old  postmenopausal female of -American descent. She underwent menarche at age 15 and had her first child at age 32. She underwent menopause at age 49 and never took any hormone replacement therapy. She has a strong family history with her maternal aunt who had breast cancer at age 65 and she has a maternal cousin who had breast cancer at age 49 and a paternal cousin had breast cancer at age 50. The patient felt a mass towards the upper outer aspect of the right breast and underwent a mammography and ultrasound on March 3, 2020 showing a 2.9 x 2.2 x 2.2 cm mass in the right breast 10:00 region 7 cm from the nipple. There was also a suspicious right axillary lymph node seen and a central right breast 9:00 intramammary lymph node. Ultrasound-guided core biopsy of the right breast 10:00 mass 7 cm from the nipple as well as the intramammary lymph node at the 9:00 region 7 centimeters from the nipple was performed on 2020. The right breast 10:00 density came back as a high-grade infiltrating ductal cancer which was ER/TX negative HER-2/hugh 3+ by IHC with a Ki-67 of 80%. The intramammary lymph node at the 9 o'clock position came back as a benign reactive lymph node. The patient underwent a bilateral breast MRI on 2020 showing the known cancer in the right breast 10:00 region 7 cm from the nipple measuring 2.8 x 3.3 x 3.5 cm. There was another area of concern in the right breast 9:00 region showing a possible ultrasound correlate 1 cm from the nipple and in the 10:00 region 12 cm from the nipple. Ultrasound-guided core biopsy was performed in the 9:00 region 1 cm from the nipple which showed fibrocystic change and stromal fibrosis. She underwent [facility] genetic panel testing in 2020 which was negative. She underwent TCHP chemotherapy with Dr. Krueger and then underwent a follow-up MRI on 2020 showing a complete radiologic response to the right breast cancer. She then underwent a right breast partial mastectomy with sentinel lymph node biopsy and bilateral reduction mastopexy's by Dr. Lees on 2020. Final pathology showed 3 negative right axillary sentinel lymph nodes and the partial mastectomy showed an 8 mm residual focus of invasive high-grade ductal breast cancer which remained ER/TX negative HER-2/hugh 3+ positive. She was downgraded from a clinical stage IIA to a pathologic prognostic stage IA breast cancer after neoadjuvant chemotherapy. She underwent external beam radiation through Dr. Cervantes which finished in 2020 and was switched to Kadcyla by Dr. Krueger which finished in 2021 and then was placed on Femara for some reason even though the tumor was ER/TX negative. The patient underwent a bilateral mammography and ultrasound at [facility] performed on May 25, 2023 showing some pleomorphic calcifications toward the posterior upper aspect of the left breast and tomosynthesis guided stereotactic biopsy was performed on 2023 which showed benign calcifications and fibroadenomatoid changes which were felt to be concordant and follow-up diagnostic left breast mammography performed on 2023 and [facility] showed stable left breast lower outer quadrant posterior benign-appearing calcifications.  She underwent her last bilateral mammography and ultrasound which was reviewed from May 7, 2024 and performed at [facility] which showed stable probably benign calcifications remaining in the lower outer aspect of the left breast however follow-up diagnostic left breast mammography was recommended at the time of her annual mammography in May 2025.  She did undergo a screening bilateral breast MRI which was reviewed from 2024 and performed at [facility] which is showed some fat necrosis towards the high upper inner aspect of the right breast on the chest wall with the patient had a prior superficial erythematous region which has since resolved.  A diagnostic right breast mammography was performed on 2024 just showing findings consistent with fat necrosis.  On exam, she has typical postop and post radiation changes to the right breast but no evidence of recurrence.  The area of fat necrosis on the upper right chest wall is just barely palpable at this time and she no longer has any erythema.  The patient was reassured and I would like to see her again in 6 months around 2025 and she should remain on Femara through Dr. Diaz. She will be due for her routine bilateral mammography and ultrasound again in May 2025 and she was given a prescriptions.  The radiologist was asking for another MRI in 6 months but given the fact that this area has clinically improved and is just consistent with fat necrosis I see no need for a 6-month MRI and she can just undergo another MRI in 2025 to stagger her studies.  The patient understands and agrees to proceed as planned.

## 2024-09-05 NOTE — ASSESSMENT
[FreeTextEntry1] : The patient is a 64-year-old  postmenopausal female of -American descent. She underwent menarche at age 15 and had her first child at age 32. She underwent menopause at age 49 and never took any hormone replacement therapy. She has a strong family history with her maternal aunt who had breast cancer at age 65 and she has a maternal cousin who had breast cancer at age 49 and a paternal cousin had breast cancer at age 50. The patient felt a mass towards the upper outer aspect of the right breast and underwent a mammography and ultrasound on March 3, 2020 showing a 2.9 x 2.2 x 2.2 cm mass in the right breast 10:00 region 7 cm from the nipple. There was also a suspicious right axillary lymph node seen and a central right breast 9:00 intramammary lymph node. Ultrasound-guided core biopsy of the right breast 10:00 mass 7 cm from the nipple as well as the intramammary lymph node at the 9:00 region 7 centimeters from the nipple was performed on 2020. The right breast 10:00 density came back as a high-grade infiltrating ductal cancer which was ER/[place] negative HER-2/hugh 3+ by IHC with a Ki-67 of 80%. The intramammary lymph node at the 9 o'clock position came back as a benign reactive lymph node. The patient underwent a bilateral breast MRI on 2020 showing the known cancer in the right breast 10:00 region 7 cm from the nipple measuring 2.8 x 3.3 x 3.5 cm. There was another area of concern in the right breast 9:00 region showing a possible ultrasound correlate 1 cm from the nipple and in the 10:00 region 12 cm from the nipple. Ultrasound-guided core biopsy was performed in the 9:00 region 1 cm from the nipple which showed fibrocystic change and stromal fibrosis. She underwent [facility] genetic panel testing in 2020 which was negative. She underwent TCHP chemotherapy with Dr. Krueger and then underwent a follow-up MRI on 2020 showing a complete radiologic response to the right breast cancer. She then underwent a right breast partial mastectomy with sentinel lymph node biopsy and bilateral reduction mastopexy's by Dr. Lees on 2020. Final pathology showed 3 negative right axillary sentinel lymph nodes and the partial mastectomy showed an 8 mm residual focus of invasive high-grade ductal breast cancer which remained ER/[place] negative HER-2/hugh 3+ positive. She was downgraded from a clinical stage IIA to a pathologic prognostic stage IA breast cancer after neoadjuvant chemotherapy. She underwent external beam radiation through Dr. Cervantes which finished in 2020 and was switched to Kadcyla by Dr. Krueger which finished in 2021 and then was placed on Femara for some reason even though the tumor was ER/[place] negative. The patient underwent a bilateral mammography and ultrasound at [facility] performed on May 25, 2023 showing some pleomorphic calcifications toward the posterior upper aspect of the left breast and tomosynthesis guided stereotactic biopsy was performed on 2023 which showed benign calcifications and fibroadenomatoid changes which were felt to be concordant and follow-up diagnostic left breast mammography performed on 2023 and [facility] showed stable left breast lower outer quadrant posterior benign-appearing calcifications.  She underwent her last bilateral mammography and ultrasound which was reviewed from May 7, 2024 and performed at [facility] which showed stable probably benign calcifications remaining in the lower outer aspect of the left breast however follow-up diagnostic left breast mammography was recommended at the time of her annual mammography in May 2025.  She did undergo a screening bilateral breast MRI which was reviewed from 2024 and performed at [facility] which is showed some fat necrosis towards the high upper inner aspect of the right breast on the chest wall with the patient had a prior superficial erythematous region which has since resolved.  A diagnostic right breast mammography was performed on 2024 just showing findings consistent with fat necrosis.  On exam, she has typical postop and post radiation changes to the right breast but no evidence of recurrence.  The area of fat necrosis on the upper right chest wall is just barely palpable at this time and she no longer has any erythema.  The patient was reassured and I would like to see her again in 6 months around 2025 and she should remain on Femara through Dr. Diaz. She will be due for her routine bilateral mammography and ultrasound again in May 2025 and she was given a prescriptions.  The radiologist was asking for another MRI in 6 months but given the fact that this area has clinically improved and is just consistent with fat necrosis I see no need for a 6-month MRI and she can just undergo another MRI in 2025 to stagger her studies.  The patient understands and agrees to proceed as planned.

## 2024-09-05 NOTE — HISTORY OF PRESENT ILLNESS
[FreeTextEntry1] : The patient is a 64-year-old  postmenopausal female of -American descent.  She underwent menarche at age 15 and had her first child at age 32.  She underwent menopause at age 49 and never took any hormone replacement therapy.  She has a strong family history with her maternal aunt who had breast cancer at age 65 and she has a maternal cousin who had breast cancer at age 49 and a paternal cousin had breast cancer at age 50.  The patient felt a mass towards the upper outer aspect of the right breast and underwent a mammography and ultrasound on March 3, 2020 showing a 2.9 x 2.2 x 2.2 cm mass in the right breast 10:00 region 7 cm from the nipple.  There was also a suspicious right axillary lymph node seen and a central right breast 9:00 intramammary lymph node.  Ultrasound-guided core biopsy of the right breast 10:00 mass 7 cm from the nipple as well as the intramammary lymph node at the 9:00 region 7 centimeters from the nipple was performed on 2020.  The right breast 10:00 density came back as a high-grade infiltrating ductal cancer which was ER/IA negative HER-2/hugh 3+ by IHC with a Ki-67 of 80%.  The intramammary lymph node at the 9 o'clock position came back as a benign reactive lymph node.  The patient underwent a bilateral breast MRI in 2020 showing the known cancer in the right breast 10:00 region 7 cm from the nipple measuring 2.8 x 3.3 x 3.5 cm.  There was another area of concern in the right breast 9:00 region showing a possible ultrasound correlate 1 cm from the nipple and in the 10:00 region 12 cm from the nipple.  Ultrasound-guided core biopsy was performed in the 9:00 region 1 cm from the nipple which is showed fibrocystic change and stromal fibrosis.  She underwent [facility] genetic panel testing in 2020 which was negative.  She underwent TCHP chemotherapy with Dr. Krueger and then underwent a follow-up MRI on 2020 showing a complete radiologic response to the right breast cancer.  She then underwent a right breast partial mastectomy with sentinel lymph node biopsy and bilateral reduction mastopexy's by Dr. Lees on 2020.  Final pathology showed 3 negative right axillary sentinel lymph nodes and the partial mastectomy showed an 8 mm residual focus of invasive high-grade ductal breast cancer which remained ER/IA negative HER-2/hugh 3+ positive.  She was downgraded from a clinical stage IIA to a pathologic prognostic stage IA breast cancer after neoadjuvant chemotherapy.  She underwent external beam radiation through Dr. Cervantes which finished in 2020 and was switched to Kadcyla by Dr. Krueger which she finished in 2021 and was started on Femara for some reason.  She did undergo a left breast lower outer quadrant stereotactic core biopsy for calcifications on 2023 at [facility] which were benign.  She comes in now for routine follow-up and continues to get yearly mammography and ultrasounds.

## 2024-09-05 NOTE — REASON FOR VISIT
[Follow-Up: _____] : a [unfilled] follow-up visit [FreeTextEntry1] : The patient comes in with a history of a right breast upper outer quadrant high-grade invasive ductal cancer which was ER TX negative HER-2/hugh 3+ with a high Ki-67 diagnosed around March 2020 for which she underwent neoadjuvant TCHP chemotherapy with  then underwent a right breast partial mastectomy and sentinel lymph node biopsy with bilateral reduction mastopexy by Dr. Lees on October 12, 2020.  She had 3 negative right axillary lymph nodes and a residual 8 mm focus of high-grade invasive duct cancer which was downgraded from a clinical stage IIA to a pathologic prognostic stage IA breast cancer after neoadjuvant chemotherapy.  She was switched to Kadcyla and underwent external beam radiation to the right breast and comes in for routine follow-up.

## 2024-09-05 NOTE — REASON FOR VISIT
[Follow-Up: _____] : a [unfilled] follow-up visit [FreeTextEntry1] : The patient comes in with a history of a right breast upper outer quadrant high-grade invasive ductal cancer which was ER [place] negative HER-2/hugh 3+ with a high Ki-67 diagnosed around March 2020 for which she underwent neoadjuvant TCHP chemotherapy with  then underwent a right breast partial mastectomy and sentinel lymph node biopsy with bilateral reduction mastopexy by Dr. Lees on October 12, 2020.  She had 3 negative right axillary lymph nodes and a residual 8 mm focus of high-grade invasive duct cancer which was downgraded from a clinical stage IIA to a pathologic prognostic stage IA breast cancer after neoadjuvant chemotherapy.  She was switched to Kadcyla and underwent external beam radiation to the right breast and comes in for routine follow-up.

## 2024-09-05 NOTE — HISTORY OF PRESENT ILLNESS
[FreeTextEntry1] : The patient is a 64-year-old  postmenopausal female of -American descent.  She underwent menarche at age 15 and had her first child at age 32.  She underwent menopause at age 49 and never took any hormone replacement therapy.  She has a strong family history with her maternal aunt who had breast cancer at age 65 and she has a maternal cousin who had breast cancer at age 49 and a paternal cousin had breast cancer at age 50.  The patient felt a mass towards the upper outer aspect of the right breast and underwent a mammography and ultrasound on March 3, 2020 showing a 2.9 x 2.2 x 2.2 cm mass in the right breast 10:00 region 7 cm from the nipple.  There was also a suspicious right axillary lymph node seen and a central right breast 9:00 intramammary lymph node.  Ultrasound-guided core biopsy of the right breast 10:00 mass 7 cm from the nipple as well as the intramammary lymph node at the 9:00 region 7 centimeters from the nipple was performed on 2020.  The right breast 10:00 density came back as a high-grade infiltrating ductal cancer which was ER/WA negative HER-2/hugh 3+ by IHC with a Ki-67 of 80%.  The intramammary lymph node at the 9 o'clock position came back as a benign reactive lymph node.  The patient underwent a bilateral breast MRI in 2020 showing the known cancer in the right breast 10:00 region 7 cm from the nipple measuring 2.8 x 3.3 x 3.5 cm.  There was another area of concern in the right breast 9:00 region showing a possible ultrasound correlate 1 cm from the nipple and in the 10:00 region 12 cm from the nipple.  Ultrasound-guided core biopsy was performed in the 9:00 region 1 cm from the nipple which is showed fibrocystic change and stromal fibrosis.  She underwent [facility] genetic panel testing in 2020 which was negative.  She underwent TCHP chemotherapy with Dr. Krueger and then underwent a follow-up MRI on 2020 showing a complete radiologic response to the right breast cancer.  She then underwent a right breast partial mastectomy with sentinel lymph node biopsy and bilateral reduction mastopexy's by Dr. Lees on 2020.  Final pathology showed 3 negative right axillary sentinel lymph nodes and the partial mastectomy showed an 8 mm residual focus of invasive high-grade ductal breast cancer which remained ER/WA negative HER-2/hugh 3+ positive.  She was downgraded from a clinical stage IIA to a pathologic prognostic stage IA breast cancer after neoadjuvant chemotherapy.  She underwent external beam radiation through Dr. Cervantes which finished in 2020 and was switched to Kadcyla by Dr. Krueger which she finished in 2021 and was started on Femara for some reason.  She did undergo a left breast lower outer quadrant stereotactic core biopsy for calcifications on 2023 at [facility] which were benign.  She comes in now for routine follow-up and continues to get yearly mammography and ultrasounds.

## 2024-09-05 NOTE — ADDENDUM
[FreeTextEntry1] : I spent greater than 75% of the consultation in face-to-face counseling and coordination care in this patient with a history of a right breast cancer who underwent a partial mastectomy and comes in now for routine breast cancer screening/surveillance.

## 2024-09-05 NOTE — PHYSICAL EXAM
[Normocephalic] : normocephalic [Atraumatic] : atraumatic [EOMI] : extra ocular movement intact [Supple] : supple [No Supraclavicular Adenopathy] : no supraclavicular adenopathy [No Cervical Adenopathy] : no cervical adenopathy [Normal Sinus Rhythm] : normal sinus rhythm [Examined in the supine and seated position] : examined in the supine and seated position [No dominant masses] : no dominant masses in right breast  [No dominant masses] : no dominant masses left breast [No Nipple Retraction] : no left nipple retraction [No Nipple Discharge] : no left nipple discharge [Breast Mass Right Breast ___cm] : no masses [Breast Mass Left Breast ___cm] : no masses [No Axillary Lymphadenopathy] : no left axillary lymphadenopathy [No Edema] : no edema [No Rashes] : no rashes [No Ulceration] : no ulceration [Breast Nipple Inversion] : nipples not inverted [Breast Nipple Retraction] : nipples not retracted [Breast Nipple Flattening] : nipples not flattened [Breast Nipple Fissures] : nipples not fissured [Breast Abnormal Lactation (Galactorrhea)] : no galactorrhea [Breast Abnormal Secretion Bloody Fluid] : no bloody discharge [Breast Abnormal Secretion Serous Fluid] : no serous discharge [Breast Abnormal Secretion Opalescent Fluid] : no milky discharge [de-identified] : On exam, the patient has the obvious bilateral reduction mastopexy's.  She did have radiation to the right breast and does have some increased skin thickening.  I cannot feel any evidence of recurrence in the right breast and her left breast is negative.  She has no axillary, supraclavicular, or cervical adenopathy. [de-identified] : Status post partial mastectomy with reduction mastopexy and external beam radiation with some skin thickening and mild lymphedema [de-identified] : Status post reduction mastopexy for symmetry

## 2024-09-05 NOTE — PHYSICAL EXAM
[Normocephalic] : normocephalic [Atraumatic] : atraumatic [EOMI] : extra ocular movement intact [Supple] : supple [No Supraclavicular Adenopathy] : no supraclavicular adenopathy [No Cervical Adenopathy] : no cervical adenopathy [Normal Sinus Rhythm] : normal sinus rhythm [Examined in the supine and seated position] : examined in the supine and seated position [No dominant masses] : no dominant masses in right breast  [No dominant masses] : no dominant masses left breast [No Nipple Retraction] : no left nipple retraction [No Nipple Discharge] : no left nipple discharge [Breast Mass Right Breast ___cm] : no masses [Breast Mass Left Breast ___cm] : no masses [No Axillary Lymphadenopathy] : no left axillary lymphadenopathy [No Edema] : no edema [No Rashes] : no rashes [No Ulceration] : no ulceration [Breast Nipple Inversion] : nipples not inverted [Breast Nipple Retraction] : nipples not retracted [Breast Nipple Flattening] : nipples not flattened [Breast Nipple Fissures] : nipples not fissured [Breast Abnormal Lactation (Galactorrhea)] : no galactorrhea [Breast Abnormal Secretion Bloody Fluid] : no bloody discharge [Breast Abnormal Secretion Serous Fluid] : no serous discharge [Breast Abnormal Secretion Opalescent Fluid] : no milky discharge [de-identified] : On exam, the patient has the obvious bilateral reduction mastopexy's.  She did have radiation to the right breast and does have some increased skin thickening.  I cannot feel any evidence of recurrence in the right breast and her left breast is negative.  She has no axillary, supraclavicular, or cervical adenopathy. [de-identified] : Status post partial mastectomy with reduction mastopexy and external beam radiation with some skin thickening and mild lymphedema [de-identified] : Status post reduction mastopexy for symmetry

## 2024-09-05 NOTE — REASON FOR VISIT
[Follow-Up: _____] : a [unfilled] follow-up visit [FreeTextEntry1] : The patient comes in with a history of a right breast upper outer quadrant high-grade invasive ductal cancer which was ER CT negative HER-2/hugh 3+ with a high Ki-67 diagnosed around March 2020 for which she underwent neoadjuvant TCHP chemotherapy with  then underwent a right breast partial mastectomy and sentinel lymph node biopsy with bilateral reduction mastopexy by Dr. Lees on October 12, 2020.  She had 3 negative right axillary lymph nodes and a residual 8 mm focus of high-grade invasive duct cancer which was downgraded from a clinical stage IIA to a pathologic prognostic stage IA breast cancer after neoadjuvant chemotherapy.  She was switched to Kadcyla and underwent external beam radiation to the right breast and comes in for routine follow-up.

## 2024-09-05 NOTE — PHYSICAL EXAM
[Normocephalic] : normocephalic [Atraumatic] : atraumatic [EOMI] : extra ocular movement intact [Supple] : supple [No Supraclavicular Adenopathy] : no supraclavicular adenopathy [No Cervical Adenopathy] : no cervical adenopathy [Normal Sinus Rhythm] : normal sinus rhythm [Examined in the supine and seated position] : examined in the supine and seated position [No dominant masses] : no dominant masses in right breast  [No dominant masses] : no dominant masses left breast [No Nipple Retraction] : no left nipple retraction [No Nipple Discharge] : no left nipple discharge [Breast Mass Right Breast ___cm] : no masses [Breast Mass Left Breast ___cm] : no masses [No Axillary Lymphadenopathy] : no left axillary lymphadenopathy [No Edema] : no edema [No Rashes] : no rashes [No Ulceration] : no ulceration [Breast Nipple Inversion] : nipples not inverted [Breast Nipple Retraction] : nipples not retracted [Breast Nipple Flattening] : nipples not flattened [Breast Nipple Fissures] : nipples not fissured [Breast Abnormal Lactation (Galactorrhea)] : no galactorrhea [Breast Abnormal Secretion Bloody Fluid] : no bloody discharge [Breast Abnormal Secretion Serous Fluid] : no serous discharge [Breast Abnormal Secretion Opalescent Fluid] : no milky discharge [de-identified] : On exam, the patient has the obvious bilateral reduction mastopexy's.  She did have radiation to the right breast and does have some increased skin thickening.  I cannot feel any evidence of recurrence in the right breast and her left breast is negative.  She has no axillary, supraclavicular, or cervical adenopathy. [de-identified] : Status post partial mastectomy with reduction mastopexy and external beam radiation with some skin thickening and mild lymphedema [de-identified] : Status post reduction mastopexy for symmetry

## 2024-09-05 NOTE — REASON FOR VISIT
[Follow-Up: _____] : a [unfilled] follow-up visit [FreeTextEntry1] : The patient comes in with a history of a right breast upper outer quadrant high-grade invasive ductal cancer which was ER MI negative HER-2/hugh 3+ with a high Ki-67 diagnosed around March 2020 for which she underwent neoadjuvant TCHP chemotherapy with  then underwent a right breast partial mastectomy and sentinel lymph node biopsy with bilateral reduction mastopexy by Dr. Lees on October 12, 2020.  She had 3 negative right axillary lymph nodes and a residual 8 mm focus of high-grade invasive duct cancer which was downgraded from a clinical stage IIA to a pathologic prognostic stage IA breast cancer after neoadjuvant chemotherapy.  She was switched to Kadcyla and underwent external beam radiation to the right breast and comes in for routine follow-up.

## 2024-09-05 NOTE — HISTORY OF PRESENT ILLNESS
[FreeTextEntry1] : The patient is a 64-year-old  postmenopausal female of -American descent.  She underwent menarche at age 15 and had her first child at age 32.  She underwent menopause at age 49 and never took any hormone replacement therapy.  She has a strong family history with her maternal aunt who had breast cancer at age 65 and she has a maternal cousin who had breast cancer at age 49 and a paternal cousin had breast cancer at age 50.  The patient felt a mass towards the upper outer aspect of the right breast and underwent a mammography and ultrasound on March 3, 2020 showing a 2.9 x 2.2 x 2.2 cm mass in the right breast 10:00 region 7 cm from the nipple.  There was also a suspicious right axillary lymph node seen and a central right breast 9:00 intramammary lymph node.  Ultrasound-guided core biopsy of the right breast 10:00 mass 7 cm from the nipple as well as the intramammary lymph node at the 9:00 region 7 centimeters from the nipple was performed on 2020.  The right breast 10:00 density came back as a high-grade infiltrating ductal cancer which was ER/CA negative HER-2/hugh 3+ by IHC with a Ki-67 of 80%.  The intramammary lymph node at the 9 o'clock position came back as a benign reactive lymph node.  The patient underwent a bilateral breast MRI in 2020 showing the known cancer in the right breast 10:00 region 7 cm from the nipple measuring 2.8 x 3.3 x 3.5 cm.  There was another area of concern in the right breast 9:00 region showing a possible ultrasound correlate 1 cm from the nipple and in the 10:00 region 12 cm from the nipple.  Ultrasound-guided core biopsy was performed in the 9:00 region 1 cm from the nipple which is showed fibrocystic change and stromal fibrosis.  She underwent [facility] genetic panel testing in 2020 which was negative.  She underwent TCHP chemotherapy with Dr. Krueger and then underwent a follow-up MRI on 2020 showing a complete radiologic response to the right breast cancer.  She then underwent a right breast partial mastectomy with sentinel lymph node biopsy and bilateral reduction mastopexy's by Dr. Lees on 2020.  Final pathology showed 3 negative right axillary sentinel lymph nodes and the partial mastectomy showed an 8 mm residual focus of invasive high-grade ductal breast cancer which remained ER/CA negative HER-2/hugh 3+ positive.  She was downgraded from a clinical stage IIA to a pathologic prognostic stage IA breast cancer after neoadjuvant chemotherapy.  She underwent external beam radiation through Dr. Cervantes which finished in 2020 and was switched to Kadcyla by Dr. Krueger which she finished in 2021 and was started on Femara for some reason.  She did undergo a left breast lower outer quadrant stereotactic core biopsy for calcifications on 2023 at [facility] which were benign.  She comes in now for routine follow-up and continues to get yearly mammography and ultrasounds.

## 2024-09-05 NOTE — PHYSICAL EXAM
[Normocephalic] : normocephalic [Atraumatic] : atraumatic [EOMI] : extra ocular movement intact [Supple] : supple [No Supraclavicular Adenopathy] : no supraclavicular adenopathy [No Cervical Adenopathy] : no cervical adenopathy [Normal Sinus Rhythm] : normal sinus rhythm [Examined in the supine and seated position] : examined in the supine and seated position [No dominant masses] : no dominant masses in right breast  [No dominant masses] : no dominant masses left breast [No Nipple Retraction] : no left nipple retraction [No Nipple Discharge] : no left nipple discharge [Breast Mass Right Breast ___cm] : no masses [Breast Mass Left Breast ___cm] : no masses [No Axillary Lymphadenopathy] : no left axillary lymphadenopathy [No Edema] : no edema [No Rashes] : no rashes [No Ulceration] : no ulceration [Breast Nipple Inversion] : nipples not inverted [Breast Nipple Retraction] : nipples not retracted [Breast Nipple Flattening] : nipples not flattened [Breast Nipple Fissures] : nipples not fissured [Breast Abnormal Lactation (Galactorrhea)] : no galactorrhea [Breast Abnormal Secretion Bloody Fluid] : no bloody discharge [Breast Abnormal Secretion Serous Fluid] : no serous discharge [Breast Abnormal Secretion Opalescent Fluid] : no milky discharge [de-identified] : On exam, the patient has the obvious bilateral reduction mastopexy's.  She did have radiation to the right breast and does have some increased skin thickening.  I cannot feel any evidence of recurrence in the right breast and her left breast is negative.  She has no axillary, supraclavicular, or cervical adenopathy. [de-identified] : Status post partial mastectomy with reduction mastopexy and external beam radiation with some skin thickening and mild lymphedema [de-identified] : Status post reduction mastopexy for symmetry

## 2024-09-05 NOTE — ASSESSMENT
[FreeTextEntry1] : The patient is a 64-year-old  postmenopausal female of -American descent. She underwent menarche at age 15 and had her first child at age 32. She underwent menopause at age 49 and never took any hormone replacement therapy. She has a strong family history with her maternal aunt who had breast cancer at age 65 and she has a maternal cousin who had breast cancer at age 49 and a paternal cousin had breast cancer at age 50. The patient felt a mass towards the upper outer aspect of the right breast and underwent a mammography and ultrasound on March 3, 2020 showing a 2.9 x 2.2 x 2.2 cm mass in the right breast 10:00 region 7 cm from the nipple. There was also a suspicious right axillary lymph node seen and a central right breast 9:00 intramammary lymph node. Ultrasound-guided core biopsy of the right breast 10:00 mass 7 cm from the nipple as well as the intramammary lymph node at the 9:00 region 7 centimeters from the nipple was performed on 2020. The right breast 10:00 density came back as a high-grade infiltrating ductal cancer which was ER/IN negative HER-2/hugh 3+ by IHC with a Ki-67 of 80%. The intramammary lymph node at the 9 o'clock position came back as a benign reactive lymph node. The patient underwent a bilateral breast MRI on 2020 showing the known cancer in the right breast 10:00 region 7 cm from the nipple measuring 2.8 x 3.3 x 3.5 cm. There was another area of concern in the right breast 9:00 region showing a possible ultrasound correlate 1 cm from the nipple and in the 10:00 region 12 cm from the nipple. Ultrasound-guided core biopsy was performed in the 9:00 region 1 cm from the nipple which showed fibrocystic change and stromal fibrosis. She underwent [facility] genetic panel testing in 2020 which was negative. She underwent TCHP chemotherapy with Dr. Krueger and then underwent a follow-up MRI on 2020 showing a complete radiologic response to the right breast cancer. She then underwent a right breast partial mastectomy with sentinel lymph node biopsy and bilateral reduction mastopexy's by Dr. Lees on 2020. Final pathology showed 3 negative right axillary sentinel lymph nodes and the partial mastectomy showed an 8 mm residual focus of invasive high-grade ductal breast cancer which remained ER/IN negative HER-2/hugh 3+ positive. She was downgraded from a clinical stage IIA to a pathologic prognostic stage IA breast cancer after neoadjuvant chemotherapy. She underwent external beam radiation through Dr. Cervantes which finished in 2020 and was switched to Kadcyla by Dr. Krueger which finished in 2021 and then was placed on Femara for some reason even though the tumor was ER/IN negative. The patient underwent a bilateral mammography and ultrasound at [facility] performed on May 25, 2023 showing some pleomorphic calcifications toward the posterior upper aspect of the left breast and tomosynthesis guided stereotactic biopsy was performed on 2023 which showed benign calcifications and fibroadenomatoid changes which were felt to be concordant and follow-up diagnostic left breast mammography performed on 2023 and [facility] showed stable left breast lower outer quadrant posterior benign-appearing calcifications.  She underwent her last bilateral mammography and ultrasound which was reviewed from May 7, 2024 and performed at [facility] which showed stable probably benign calcifications remaining in the lower outer aspect of the left breast however follow-up diagnostic left breast mammography was recommended at the time of her annual mammography in May 2025.  She did undergo a screening bilateral breast MRI which was reviewed from 2024 and performed at [facility] which is showed some fat necrosis towards the high upper inner aspect of the right breast on the chest wall with the patient had a prior superficial erythematous region which has since resolved.  A diagnostic right breast mammography was performed on 2024 just showing findings consistent with fat necrosis.  On exam, she has typical postop and post radiation changes to the right breast but no evidence of recurrence.  The area of fat necrosis on the upper right chest wall is just barely palpable at this time and she no longer has any erythema.  The patient was reassured and I would like to see her again in 6 months around 2025 and she should remain on Femara through Dr. Diaz. She will be due for her routine bilateral mammography and ultrasound again in May 2025 and she was given a prescriptions.  The radiologist was asking for another MRI in 6 months but given the fact that this area has clinically improved and is just consistent with fat necrosis I see no need for a 6-month MRI and she can just undergo another MRI in 2025 to stagger her studies.  The patient understands and agrees to proceed as planned.

## 2025-02-11 ENCOUNTER — NON-APPOINTMENT (OUTPATIENT)
Age: 65
End: 2025-02-11

## 2025-03-11 ENCOUNTER — APPOINTMENT (OUTPATIENT)
Dept: BREAST CENTER | Facility: CLINIC | Age: 65
End: 2025-03-11
Payer: COMMERCIAL

## 2025-03-11 VITALS
DIASTOLIC BLOOD PRESSURE: 83 MMHG | WEIGHT: 169 LBS | HEART RATE: 78 BPM | HEIGHT: 66 IN | OXYGEN SATURATION: 97 % | BODY MASS INDEX: 27.16 KG/M2 | SYSTOLIC BLOOD PRESSURE: 139 MMHG

## 2025-03-11 DIAGNOSIS — Z80.3 FAMILY HISTORY OF MALIGNANT NEOPLASM OF BREAST: ICD-10-CM

## 2025-03-11 DIAGNOSIS — Z90.11 ACQUIRED ABSENCE OF RIGHT BREAST AND NIPPLE: ICD-10-CM

## 2025-03-11 DIAGNOSIS — Z85.3 PERSONAL HISTORY OF MALIGNANT NEOPLASM OF BREAST: ICD-10-CM

## 2025-03-11 DIAGNOSIS — Z12.39 ENCOUNTER FOR OTHER SCREENING FOR MALIGNANT NEOPLASM OF BREAST: ICD-10-CM

## 2025-03-11 PROCEDURE — 99213 OFFICE O/P EST LOW 20 MIN: CPT

## 2025-05-07 ENCOUNTER — RESULT REVIEW (OUTPATIENT)
Age: 65
End: 2025-05-07

## 2025-08-02 ENCOUNTER — NON-APPOINTMENT (OUTPATIENT)
Age: 65
End: 2025-08-02

## 2025-09-18 ENCOUNTER — APPOINTMENT (OUTPATIENT)
Dept: BREAST CENTER | Facility: CLINIC | Age: 65
End: 2025-09-18
Payer: COMMERCIAL

## 2025-09-18 VITALS
WEIGHT: 169 LBS | HEIGHT: 66 IN | HEART RATE: 90 BPM | BODY MASS INDEX: 27.16 KG/M2 | DIASTOLIC BLOOD PRESSURE: 70 MMHG | OXYGEN SATURATION: 97 % | SYSTOLIC BLOOD PRESSURE: 120 MMHG

## 2025-09-18 DIAGNOSIS — Z85.3 PERSONAL HISTORY OF MALIGNANT NEOPLASM OF BREAST: ICD-10-CM

## 2025-09-18 DIAGNOSIS — Z80.3 FAMILY HISTORY OF MALIGNANT NEOPLASM OF BREAST: ICD-10-CM

## 2025-09-18 DIAGNOSIS — Z90.11 ACQUIRED ABSENCE OF RIGHT BREAST AND NIPPLE: ICD-10-CM

## 2025-09-18 DIAGNOSIS — Z12.39 ENCOUNTER FOR OTHER SCREENING FOR MALIGNANT NEOPLASM OF BREAST: ICD-10-CM

## 2025-09-18 PROCEDURE — 99213 OFFICE O/P EST LOW 20 MIN: CPT
